# Patient Record
Sex: FEMALE | Race: BLACK OR AFRICAN AMERICAN | NOT HISPANIC OR LATINO
[De-identification: names, ages, dates, MRNs, and addresses within clinical notes are randomized per-mention and may not be internally consistent; named-entity substitution may affect disease eponyms.]

---

## 2020-03-09 PROBLEM — Z00.00 ENCOUNTER FOR PREVENTIVE HEALTH EXAMINATION: Status: ACTIVE | Noted: 2020-03-09

## 2020-04-02 ENCOUNTER — APPOINTMENT (OUTPATIENT)
Dept: PULMONOLOGY | Facility: CLINIC | Age: 70
End: 2020-04-02

## 2020-04-14 ENCOUNTER — NON-APPOINTMENT (OUTPATIENT)
Age: 70
End: 2020-04-14

## 2020-04-17 ENCOUNTER — APPOINTMENT (OUTPATIENT)
Dept: PULMONOLOGY | Facility: CLINIC | Age: 70
End: 2020-04-17

## 2020-04-20 DIAGNOSIS — Z87.39 PERSONAL HISTORY OF OTHER DISEASES OF THE MUSCULOSKELETAL SYSTEM AND CONNECTIVE TISSUE: ICD-10-CM

## 2020-04-20 DIAGNOSIS — Z87.19 PERSONAL HISTORY OF OTHER DISEASES OF THE DIGESTIVE SYSTEM: ICD-10-CM

## 2020-04-20 DIAGNOSIS — Z86.39 PERSONAL HISTORY OF OTHER ENDOCRINE, NUTRITIONAL AND METABOLIC DISEASE: ICD-10-CM

## 2020-04-20 RX ORDER — SITAGLIPTIN AND METFORMIN HYDROCHLORIDE 100; 1000 MG/1; MG/1
100-1000 TABLET, FILM COATED, EXTENDED RELEASE ORAL
Qty: 30 | Refills: 0 | Status: ACTIVE | COMMUNITY
Start: 2019-11-18

## 2020-04-20 RX ORDER — CHOLECALCIFEROL (VITAMIN D3) 1250 MCG
1.25 MG CAPSULE ORAL
Qty: 12 | Refills: 0 | Status: ACTIVE | COMMUNITY
Start: 2019-11-18

## 2020-04-20 RX ORDER — FLASH GLUCOSE SENSOR
KIT MISCELLANEOUS
Qty: 2 | Refills: 0 | Status: ACTIVE | COMMUNITY
Start: 2020-02-26

## 2020-04-20 RX ORDER — TOBRAMYCIN 3 MG/ML
0.3 SOLUTION/ DROPS OPHTHALMIC
Qty: 5 | Refills: 0 | Status: ACTIVE | COMMUNITY
Start: 2020-03-17

## 2020-04-20 RX ORDER — VALSARTAN 160 MG/1
160 TABLET ORAL
Qty: 30 | Refills: 0 | Status: ACTIVE | COMMUNITY
Start: 2020-01-16

## 2020-04-21 ENCOUNTER — APPOINTMENT (OUTPATIENT)
Dept: PULMONOLOGY | Facility: CLINIC | Age: 70
End: 2020-04-21
Payer: COMMERCIAL

## 2020-04-21 ENCOUNTER — APPOINTMENT (OUTPATIENT)
Dept: PULMONOLOGY | Facility: CLINIC | Age: 70
End: 2020-04-21

## 2020-04-21 DIAGNOSIS — E66.9 OBESITY, UNSPECIFIED: ICD-10-CM

## 2020-04-21 PROCEDURE — 99443: CPT

## 2020-04-22 ENCOUNTER — NON-APPOINTMENT (OUTPATIENT)
Age: 70
End: 2020-04-22

## 2020-04-24 NOTE — HISTORY OF PRESENT ILLNESS
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 PMH of DM, arthritis,  knee surgery, obesity, GERD. She is a life long non smoker. No pets at home. She is 5'7", approx 170 lbs\par \par she reports dyspnea is worse with extreme exertion. 6mwt in Dr Araujo office showed desat 88%\par Pt was using inhalers but they did not help so she stopped\par \par CXR showed overinflation\par CTA chest was neg for pE but showed sclerotic changes of her LS spine\par \par

## 2020-04-24 NOTE — DISCUSSION/SUMMARY
[FreeTextEntry1] : -----assessment and plan----\par 68 yo referred for chronic dyspnea\par \par will start prednisone 15mg x 1 week then taper to 10mg- f/u next week on how she is feeling\par will get bone scan given sclerotic LS spine\par will get labs (done at work) to r/o multiple myeloma\par will check cpet and EOT at ofice visit in july\par sleep study w/Dr Araujo\par \par reviewed covid precautions, all questions answered\par \par \par \par Thanks for allowing  me to participate  in the care of this patient.  Patient at this time  will follow  the above mentioned recommendations and return back for follow up visit. If you have any questions  I can be reached  at #[phone] (beeper)  or  [phone] (office).\par \par Merna Little MD, FCCP \par Director, Pulmonary Hypertension Program \par [facility] \par Division of Pulmonary, Critical Care and Sleep Medicine \par  Professor of Medicine \par [employer]\par

## 2020-04-27 NOTE — REVIEW OF SYSTEMS
[Dyspnea] : dyspnea [SOB on Exertion] : sob on exertion [Diabetes] : diabetes [Negative] : Neurologic

## 2020-04-28 ENCOUNTER — APPOINTMENT (OUTPATIENT)
Dept: PULMONOLOGY | Facility: CLINIC | Age: 70
End: 2020-04-28

## 2020-04-28 ENCOUNTER — APPOINTMENT (OUTPATIENT)
Dept: PULMONOLOGY | Facility: CLINIC | Age: 70
End: 2020-04-28
Payer: COMMERCIAL

## 2020-04-28 PROCEDURE — 99443: CPT

## 2020-04-28 NOTE — HISTORY OF PRESENT ILLNESS
[Never] : never [Home] : at home, [unfilled] , at the time of the visit. 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 PMH of DM, arthritis,  knee surgery, obesity, GERD. She is a life long non smoker. No pets at home. She is 5'7", approx 170 lbs\par \par she reports dyspnea is worse with extreme exertion. 6mwt in Dr Araujo office showed desat 88%\par Pt was using inhalers but they did not help so she stopped\par \par CXR showed overinflation\par CTA chest was neg for pE but showed sclerotic changes of her LS spine\par \par april 2020- c/o dyspnea-- prednisone is causing blood sugar elevations [FreeTextEntry2] : Hua

## 2020-05-04 ENCOUNTER — RX RENEWAL (OUTPATIENT)
Age: 70
End: 2020-05-04

## 2020-07-08 ENCOUNTER — APPOINTMENT (OUTPATIENT)
Dept: PULMONOLOGY | Facility: CLINIC | Age: 70
End: 2020-07-08

## 2020-07-13 ENCOUNTER — LABORATORY RESULT (OUTPATIENT)
Age: 70
End: 2020-07-13

## 2020-07-13 ENCOUNTER — APPOINTMENT (OUTPATIENT)
Dept: PULMONOLOGY | Facility: CLINIC | Age: 70
End: 2020-07-13
Payer: COMMERCIAL

## 2020-07-13 VITALS
OXYGEN SATURATION: 99 % | DIASTOLIC BLOOD PRESSURE: 90 MMHG | WEIGHT: 175 LBS | HEART RATE: 86 BPM | TEMPERATURE: 98 F | RESPIRATION RATE: 17 BRPM | SYSTOLIC BLOOD PRESSURE: 164 MMHG

## 2020-07-13 PROCEDURE — ZZZZZ: CPT

## 2020-07-13 PROCEDURE — 94618 PULMONARY STRESS TESTING: CPT

## 2020-07-13 PROCEDURE — 94726 PLETHYSMOGRAPHY LUNG VOLUMES: CPT

## 2020-07-13 PROCEDURE — 94010 BREATHING CAPACITY TEST: CPT

## 2020-07-13 PROCEDURE — 99215 OFFICE O/P EST HI 40 MIN: CPT | Mod: 25

## 2020-07-13 PROCEDURE — 36415 COLL VENOUS BLD VENIPUNCTURE: CPT

## 2020-07-13 PROCEDURE — 94729 DIFFUSING CAPACITY: CPT

## 2020-07-13 NOTE — HISTORY OF PRESENT ILLNESS
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 PMH of DM, arthritis,  knee surgery, obesity, GERD. She is a life long non smoker. No pets at home. She is 5'7", approx 170 lbs\par \par she reports dyspnea is worse with extreme exertion. 6mwt in Dr Araujo office showed desat 88%\par Pt was using inhalers but they did not help so she stopped\par \par CXR showed overinflation\par CTA chest was neg for pE but showed sclerotic changes of her LS spine\par \par PFT July 2020 within normal limit\par Exercise oximetry July 2020 no evidence of desaturation\par april 2020- c/o dyspnea-- prednisone is causing blood sugar elevations\par \par \par july 2020---Patient was seen in the office today complaints of backache--- was referred from Dr. Araujo because on his 6-minute walk test there was some evidence of desaturation------- exercise oximetry done in our office in July 2020 shows no evidence of desaturation-----she does have evidence of sclerotic process in her lumbar spine for which she has been referred for further work-up to spine specialist and hematology oncology----------- I would like to get details of her cardiac work-up from Dr. Clifford's office------ advised to do full pulmonary function testing sleep study through Dr. Araujo's office------ return to my clinic in 2 months time\par \par ----I have kept her on steroids but she felt better on that but was not able to tolerate it and that has been stopped I have advised her to continue with Symbicort in the meantime

## 2020-07-13 NOTE — DISCUSSION/SUMMARY
[FreeTextEntry1] : -----assessment and plan----\par 68 yo referred for chronic dyspnea\par Not clear if she has any specific pulmonary disorders she did not desaturate on exercise oximetry in July 2020---\par --- unable to tolerate prednisone\par \par Continue Symbicort and Ventolin for now\par \par --- Further work-up for sclerotic process in the spine rule out multiple myeloma-Dr. Baltazar Abbott\par \par Due to backache unable to do CT PET\par \par Sleep study \par ----Diabetes mellitus follow-up with endocrinologist\par f/u in  2 months \par reviewed covid precautions, all questions answered\par \par \par \par Thanks for allowing  me to participate  in the care of this patient.  Patient at this time  will follow  the above mentioned recommendations and return back for follow up visit. If you have any questions  I can be reached  at #[phone] (beeper)  or  [phone] (office).\par \par Merna Little MD, FCCP \par Director, Pulmonary Hypertension Program \par [facility] \par Division of Pulmonary, Critical Care and Sleep Medicine \par  Professor of Medicine \par [employer]\par

## 2020-07-13 NOTE — REVIEW OF SYSTEMS
[Dyspnea] : dyspnea [SOB on Exertion] : sob on exertion [Diabetes] : diabetes [Negative] : Psychiatric

## 2020-07-14 LAB
25(OH)D3 SERPL-MCNC: 28.7 NG/ML
A1AT SERPL-MCNC: 134 MG/DL
ALBUMIN SERPL ELPH-MCNC: 4.8 G/DL
ALP BLD-CCNC: 88 U/L
ALT SERPL-CCNC: 19 U/L
ANACR T: NEGATIVE
ANION GAP SERPL CALC-SCNC: 15 MMOL/L
APTT BLD: 30.1 SEC
AST SERPL-CCNC: 20 U/L
BASOPHILS # BLD AUTO: 0.01 K/UL
BASOPHILS NFR BLD AUTO: 0.2 %
BILIRUB SERPL-MCNC: 0.2 MG/DL
BUN SERPL-MCNC: 12 MG/DL
CALCIUM SERPL-MCNC: 9.9 MG/DL
CALCIUM SERPL-MCNC: 9.9 MG/DL
CARDIOLIPIN AB SER IA-ACNC: NEGATIVE
CCP AB SER IA-ACNC: <8 UNITS
CHLORIDE SERPL-SCNC: 104 MMOL/L
CO2 SERPL-SCNC: 22 MMOL/L
CREAT SERPL-MCNC: 0.73 MG/DL
DEPRECATED KAPPA LC FREE/LAMBDA SER: 1.23 RATIO
ENA SCL70 IGG SER IA-ACNC: <0.2 AL
ENA SS-A AB SER IA-ACNC: <0.2 AL
ENA SS-B AB SER IA-ACNC: <0.2 AL
EOSINOPHIL # BLD AUTO: 0.06 K/UL
EOSINOPHIL NFR BLD AUTO: 1 %
ERYTHROCYTE [SEDIMENTATION RATE] IN BLOOD BY WESTERGREN METHOD: 55 MM/HR
ESTIMATED AVERAGE GLUCOSE: 154 MG/DL
FERRITIN SERPL-MCNC: 36 NG/ML
GLUCOSE SERPL-MCNC: 94 MG/DL
HBA1C MFR BLD HPLC: 7 %
HCT VFR BLD CALC: 43.7 %
HCYS SERPL-MCNC: 8.7 UMOL/L
HGB BLD-MCNC: 13.3 G/DL
IGA SER QL IEP: 217 MG/DL
IGG SER QL IEP: 1633 MG/DL
IGM SER QL IEP: 23 MG/DL
IMM GRANULOCYTES NFR BLD AUTO: 0.2 %
INR PPP: 0.94 RATIO
KAPPA LC CSF-MCNC: 1.61 MG/DL
KAPPA LC SERPL-MCNC: 1.98 MG/DL
LDH SERPL-CCNC: 190 U/L
LYMPHOCYTES # BLD AUTO: 1.8 K/UL
LYMPHOCYTES NFR BLD AUTO: 30.7 %
MAN DIFF?: NORMAL
MCHC RBC-ENTMCNC: 27.5 PG
MCHC RBC-ENTMCNC: 30.4 GM/DL
MCV RBC AUTO: 90.5 FL
MONOCYTES # BLD AUTO: 0.42 K/UL
MONOCYTES NFR BLD AUTO: 7.2 %
NEUTROPHILS # BLD AUTO: 3.57 K/UL
NEUTROPHILS NFR BLD AUTO: 60.7 %
NT-PROBNP SERPL-MCNC: 17 PG/ML
PARATHYROID HORMONE INTACT: 59 PG/ML
PHOSPHATE SERPL-MCNC: 2.4 MG/DL
PLATELET # BLD AUTO: 292 K/UL
POTASSIUM SERPL-SCNC: 4.1 MMOL/L
PROT SERPL-MCNC: 7.6 G/DL
PT BLD: 11.1 SEC
RBC # BLD: 4.83 M/UL
RBC # FLD: 13.6 %
RF+CCP IGG SER-IMP: NEGATIVE
RHEUMATOID FACT SER QL: <10 IU/ML
SODIUM SERPL-SCNC: 140 MMOL/L
TSH SERPL-ACNC: 0.73 UIU/ML
URATE SERPL-MCNC: 4.4 MG/DL
VIT B12 SERPL-MCNC: 773 PG/ML
WBC # FLD AUTO: 5.87 K/UL

## 2020-07-15 LAB — TOTAL IGE SMQN RAST: 139 KU/L

## 2020-07-16 LAB
ALBUMIN MFR SERPL ELPH: 56.6 %
ALBUMIN SERPL-MCNC: 4.3 G/DL
ALBUMIN/GLOB SERPL: 1.3 RATIO
ALPHA1 GLOB MFR SERPL ELPH: 3.6 %
ALPHA1 GLOB SERPL ELPH-MCNC: 0.3 G/DL
ALPHA2 GLOB MFR SERPL ELPH: 11 %
ALPHA2 GLOB SERPL ELPH-MCNC: 0.8 G/DL
B-GLOBULIN MFR SERPL ELPH: 11.4 %
B-GLOBULIN SERPL ELPH-MCNC: 0.9 G/DL
GAMMA GLOB FLD ELPH-MCNC: 1.3 G/DL
GAMMA GLOB MFR SERPL ELPH: 17.4 %
INTERPRETATION SERPL IEP-IMP: NORMAL
PROT SERPL-MCNC: 7.6 G/DL
PROT SERPL-MCNC: 7.6 G/DL
SARS-COV-2 N GENE NPH QL NAA+PROBE: NOT DETECTED

## 2020-08-20 ENCOUNTER — OUTPATIENT (OUTPATIENT)
Dept: OUTPATIENT SERVICES | Facility: HOSPITAL | Age: 70
LOS: 1 days | Discharge: HOME | End: 2020-08-20

## 2020-08-20 DIAGNOSIS — C90.00 MULTIPLE MYELOMA NOT HAVING ACHIEVED REMISSION: ICD-10-CM

## 2020-08-25 ENCOUNTER — RESULT REVIEW (OUTPATIENT)
Age: 70
End: 2020-08-25

## 2020-08-25 ENCOUNTER — TRANSCRIPTION ENCOUNTER (OUTPATIENT)
Age: 70
End: 2020-08-25

## 2020-08-25 ENCOUNTER — APPOINTMENT (OUTPATIENT)
Dept: HEMATOLOGY ONCOLOGY | Facility: CLINIC | Age: 70
End: 2020-08-25
Payer: COMMERCIAL

## 2020-08-25 VITALS
OXYGEN SATURATION: 99 % | TEMPERATURE: 98 F | WEIGHT: 176.37 LBS | BODY MASS INDEX: 29.38 KG/M2 | SYSTOLIC BLOOD PRESSURE: 155 MMHG | RESPIRATION RATE: 18 BRPM | HEART RATE: 83 BPM | DIASTOLIC BLOOD PRESSURE: 81 MMHG | HEIGHT: 65 IN

## 2020-08-25 DIAGNOSIS — Z80.0 FAMILY HISTORY OF MALIGNANT NEOPLASM OF DIGESTIVE ORGANS: ICD-10-CM

## 2020-08-25 LAB
BASOPHILS # BLD AUTO: 0.03 K/UL — SIGNIFICANT CHANGE UP (ref 0–0.2)
BASOPHILS NFR BLD AUTO: 0.5 % — SIGNIFICANT CHANGE UP (ref 0–2)
EOSINOPHIL # BLD AUTO: 0.11 K/UL — SIGNIFICANT CHANGE UP (ref 0–0.5)
EOSINOPHIL NFR BLD AUTO: 1.9 % — SIGNIFICANT CHANGE UP (ref 0–6)
HCT VFR BLD CALC: 39.7 % — SIGNIFICANT CHANGE UP (ref 34.5–45)
HGB BLD-MCNC: 12.6 G/DL — SIGNIFICANT CHANGE UP (ref 11.5–15.5)
IMM GRANULOCYTES NFR BLD AUTO: 1.5 % — SIGNIFICANT CHANGE UP (ref 0–1.5)
LYMPHOCYTES # BLD AUTO: 1.77 K/UL — SIGNIFICANT CHANGE UP (ref 1–3.3)
LYMPHOCYTES # BLD AUTO: 30 % — SIGNIFICANT CHANGE UP (ref 13–44)
MCHC RBC-ENTMCNC: 28.3 PG — SIGNIFICANT CHANGE UP (ref 27–34)
MCHC RBC-ENTMCNC: 31.7 GM/DL — LOW (ref 32–36)
MCV RBC AUTO: 89 FL — SIGNIFICANT CHANGE UP (ref 80–100)
MONOCYTES # BLD AUTO: 0.4 K/UL — SIGNIFICANT CHANGE UP (ref 0–0.9)
MONOCYTES NFR BLD AUTO: 6.8 % — SIGNIFICANT CHANGE UP (ref 2–14)
NEUTROPHILS # BLD AUTO: 3.5 K/UL — SIGNIFICANT CHANGE UP (ref 1.8–7.4)
NEUTROPHILS NFR BLD AUTO: 59.3 % — SIGNIFICANT CHANGE UP (ref 43–77)
NRBC # BLD: 0 /100 WBCS — SIGNIFICANT CHANGE UP (ref 0–0)
PLATELET # BLD AUTO: 237 K/UL — SIGNIFICANT CHANGE UP (ref 150–400)
RBC # BLD: 4.46 M/UL — SIGNIFICANT CHANGE UP (ref 3.8–5.2)
RBC # FLD: 13.5 % — SIGNIFICANT CHANGE UP (ref 10.3–14.5)
WBC # BLD: 5.9 K/UL — SIGNIFICANT CHANGE UP (ref 3.8–10.5)
WBC # FLD AUTO: 5.9 K/UL — SIGNIFICANT CHANGE UP (ref 3.8–10.5)

## 2020-08-25 PROCEDURE — 99205 OFFICE O/P NEW HI 60 MIN: CPT

## 2020-09-02 NOTE — HISTORY OF PRESENT ILLNESS
[Disease:__________________________] : Disease: [unfilled] [de-identified] : 69 year old female with a pmh of DM, arthritis, knee surgery, GERD here with a slightly abnormal serum free light chain ratio but a sclerotic finding in the spine.\par In January, she started having shortness of breath. She had a cardiac work up.  CT angio was negative.   Added diovan. Then she went to see pulmonary, and was told her lungs are okay.  \par \par MRI performed 4/20: shows severe spinal canal stenosis at T12-L1.  No suspicious marrow signal abnormaility to suggest infectious process. \par  \par Back only hurts when changing position . She feels stiff. \par \par On CT scan in march 2020 for PE, she was found to have sclerotic findings throughout lumbar spine, Correlation with bone scan recommended to exclude infiltrative process. \par \par Bone scan shows no intense uptake in the osseous structures to suggest osseous metastasis. \par \par Patient has brought a copy of her bone scan, and her ct and mri today. \par \par Not sleeping more. Energy level feels less than before. She also has difficulty sleeping. Appetite is good. No f/c/s. No n/v/d. No Le swelling. \par \par Last colonoscopy, was in 2018. Due again november. \par \par Reports she craves very cold water. This seems more to be happening over the last 1 year. She now has to carry cold water all the time with her.   Not craving chewing ice. otherwise no odd cravings. She is doing B12 shows 1x/month.  [de-identified] : -

## 2020-09-02 NOTE — ASSESSMENT
[FreeTextEntry1] : 69 year old female here to see me for sclerotic bone lesions on CT. \par \par -CT scan shows a sclerotic lesions in march, since then she had an MRI and a Bone scan which do not show these findings.\par -she has brought the cd of the mri and the bone scan today. \par -will scan and see if we see any sclerotic bone lesions on these - will discuss with radiology\par -repeat IMEL today to ensure no finidngs c/w myeloma. will also repeat cmp and ldh/uric acid (all is normal)\par -pending this, return visit 4 weeks to discuss if any findgins- any reason to do bone marrow and or pet scan.\par -check iron studies? ferritin 35- maybe DERRICK causing byrd - will advise small iron supplement given iron findings, ferritin is on the lower side though not truly low\par \par

## 2020-09-09 LAB
ALBUMIN MFR SERPL ELPH: 57.1 %
ALBUMIN SERPL ELPH-MCNC: 4.6 G/DL
ALBUMIN SERPL-MCNC: 4.2 G/DL
ALBUMIN/GLOB SERPL: 1.3 RATIO
ALP BLD-CCNC: 84 U/L
ALPHA1 GLOB MFR SERPL ELPH: 3.6 %
ALPHA1 GLOB SERPL ELPH-MCNC: 0.3 G/DL
ALPHA2 GLOB MFR SERPL ELPH: 10.6 %
ALPHA2 GLOB SERPL ELPH-MCNC: 0.8 G/DL
ALT SERPL-CCNC: 13 U/L
ANION GAP SERPL CALC-SCNC: 13 MMOL/L
AST SERPL-CCNC: 18 U/L
B-GLOBULIN MFR SERPL ELPH: 10.9 %
B-GLOBULIN SERPL ELPH-MCNC: 0.8 G/DL
BILIRUB SERPL-MCNC: 0.2 MG/DL
BUN SERPL-MCNC: 10 MG/DL
CALCIUM SERPL-MCNC: 9.6 MG/DL
CHLORIDE SERPL-SCNC: 105 MMOL/L
CO2 SERPL-SCNC: 26 MMOL/L
CREAT SERPL-MCNC: 0.7 MG/DL
DEPRECATED KAPPA LC FREE/LAMBDA SER: 1.4 RATIO
DEPRECATED KAPPA LC FREE/LAMBDA SER: 1.4 RATIO
FERRITIN SERPL-MCNC: 38 NG/ML
GAMMA GLOB FLD ELPH-MCNC: 1.3 G/DL
GAMMA GLOB MFR SERPL ELPH: 17.8 %
GLUCOSE SERPL-MCNC: 94 MG/DL
IGA SER QL IEP: 173 MG/DL
IGA SER QL IEP: 173 MG/DL
IGG SER QL IEP: 1176 MG/DL
IGM SER QL IEP: 22 MG/DL
IGM SER QL IEP: 22 MG/DL
INTERPRETATION SERPL IEP-IMP: NORMAL
IRON SATN MFR SERPL: 18 %
IRON SERPL-MCNC: 60 UG/DL
KAPPA LC CSF-MCNC: 1.36 MG/DL
KAPPA LC CSF-MCNC: 1.36 MG/DL
KAPPA LC SERPL-MCNC: 1.91 MG/DL
KAPPA LC SERPL-MCNC: 1.91 MG/DL
LDH SERPL-CCNC: 165 U/L
M PROTEIN SPEC IFE-MCNC: NORMAL
POTASSIUM SERPL-SCNC: 4.2 MMOL/L
PROT SERPL-MCNC: 7.3 G/DL
PROT SERPL-MCNC: 7.4 G/DL
PROT SERPL-MCNC: 7.4 G/DL
SODIUM SERPL-SCNC: 144 MMOL/L
TIBC SERPL-MCNC: 340 UG/DL
UIBC SERPL-MCNC: 279 UG/DL
URATE SERPL-MCNC: 4.7 MG/DL

## 2020-09-28 ENCOUNTER — OUTPATIENT (OUTPATIENT)
Dept: OUTPATIENT SERVICES | Facility: HOSPITAL | Age: 70
LOS: 1 days | Discharge: HOME | End: 2020-09-28

## 2020-09-28 DIAGNOSIS — C90.00 MULTIPLE MYELOMA NOT HAVING ACHIEVED REMISSION: ICD-10-CM

## 2020-10-06 ENCOUNTER — RESULT REVIEW (OUTPATIENT)
Age: 70
End: 2020-10-06

## 2020-10-06 ENCOUNTER — APPOINTMENT (OUTPATIENT)
Dept: HEMATOLOGY ONCOLOGY | Facility: CLINIC | Age: 70
End: 2020-10-06
Payer: COMMERCIAL

## 2020-10-06 VITALS
RESPIRATION RATE: 16 BRPM | BODY MASS INDEX: 29.2 KG/M2 | WEIGHT: 175.25 LBS | HEIGHT: 65 IN | DIASTOLIC BLOOD PRESSURE: 85 MMHG | HEART RATE: 79 BPM | OXYGEN SATURATION: 98 % | SYSTOLIC BLOOD PRESSURE: 175 MMHG

## 2020-10-06 LAB
BASOPHILS # BLD AUTO: 0.03 K/UL — SIGNIFICANT CHANGE UP (ref 0–0.2)
BASOPHILS NFR BLD AUTO: 0.5 % — SIGNIFICANT CHANGE UP (ref 0–2)
EOSINOPHIL # BLD AUTO: 0.05 K/UL — SIGNIFICANT CHANGE UP (ref 0–0.5)
EOSINOPHIL NFR BLD AUTO: 0.8 % — SIGNIFICANT CHANGE UP (ref 0–6)
HCT VFR BLD CALC: 41.5 % — SIGNIFICANT CHANGE UP (ref 34.5–45)
HGB BLD-MCNC: 13 G/DL — SIGNIFICANT CHANGE UP (ref 11.5–15.5)
IMM GRANULOCYTES NFR BLD AUTO: 0.2 % — SIGNIFICANT CHANGE UP (ref 0–1.5)
LYMPHOCYTES # BLD AUTO: 2.02 K/UL — SIGNIFICANT CHANGE UP (ref 1–3.3)
LYMPHOCYTES # BLD AUTO: 30.5 % — SIGNIFICANT CHANGE UP (ref 13–44)
MCHC RBC-ENTMCNC: 28.1 PG — SIGNIFICANT CHANGE UP (ref 27–34)
MCHC RBC-ENTMCNC: 31.3 G/DL — LOW (ref 32–36)
MCV RBC AUTO: 89.8 FL — SIGNIFICANT CHANGE UP (ref 80–100)
MONOCYTES # BLD AUTO: 0.39 K/UL — SIGNIFICANT CHANGE UP (ref 0–0.9)
MONOCYTES NFR BLD AUTO: 5.9 % — SIGNIFICANT CHANGE UP (ref 2–14)
NEUTROPHILS # BLD AUTO: 4.13 K/UL — SIGNIFICANT CHANGE UP (ref 1.8–7.4)
NEUTROPHILS NFR BLD AUTO: 62.1 % — SIGNIFICANT CHANGE UP (ref 43–77)
NRBC # BLD: 0 /100 WBCS — SIGNIFICANT CHANGE UP (ref 0–0)
PLATELET # BLD AUTO: 274 K/UL — SIGNIFICANT CHANGE UP (ref 150–400)
RBC # BLD: 4.62 M/UL — SIGNIFICANT CHANGE UP (ref 3.8–5.2)
RBC # FLD: 13.6 % — SIGNIFICANT CHANGE UP (ref 10.3–14.5)
WBC # BLD: 6.63 K/UL — SIGNIFICANT CHANGE UP (ref 3.8–10.5)
WBC # FLD AUTO: 6.63 K/UL — SIGNIFICANT CHANGE UP (ref 3.8–10.5)

## 2020-10-06 PROCEDURE — 99213 OFFICE O/P EST LOW 20 MIN: CPT

## 2020-10-09 ENCOUNTER — APPOINTMENT (OUTPATIENT)
Dept: CV DIAGNOSITCS | Facility: HOSPITAL | Age: 70
End: 2020-10-09

## 2020-10-09 ENCOUNTER — OUTPATIENT (OUTPATIENT)
Dept: OUTPATIENT SERVICES | Facility: HOSPITAL | Age: 70
LOS: 1 days | End: 2020-10-09
Payer: COMMERCIAL

## 2020-10-09 DIAGNOSIS — R06.00 DYSPNEA, UNSPECIFIED: ICD-10-CM

## 2020-10-09 PROCEDURE — 93306 TTE W/DOPPLER COMPLETE: CPT | Mod: 26

## 2020-10-09 PROCEDURE — 93306 TTE W/DOPPLER COMPLETE: CPT

## 2020-10-12 NOTE — ASSESSMENT
[FreeTextEntry1] : 69 year old female here to see me for sclerotic bone lesions on CT. \par \par -CT scan shows a sclerotic lesions in march, since then she had an MRI and a Bone scan do not show these findings. discussed with radiology- no clear etiology and findings not seen on other imaging  modalities \par -IMEl remails negative except for low igM - she will discuss this with allergy immunology though no increased infections \par -return visit prn

## 2020-10-12 NOTE — HISTORY OF PRESENT ILLNESS
[Disease:__________________________] : Disease: [unfilled] [de-identified] : 69 year old female with a pmh of DM, arthritis, knee surgery, GERD here with a slightly abnormal serum free light chain ratio but a sclerotic finding in the spine.\par In January, she started having shortness of breath. She had a cardiac work up.  CT angio was negative.   Added diovan. Then she went to see pulmonary, and was told her lungs are okay.  \par \par MRI performed 4/20: shows severe spinal canal stenosis at T12-L1.  No suspicious marrow signal abnormaility to suggest infectious process. \par  \par Back only hurts when changing position . She feels stiff. \par \par On CT scan in march 2020 for PE, she was found to have sclerotic findings throughout lumbar spine, Correlation with bone scan recommended to exclude infiltrative process. \par \par Bone scan shows no intense uptake in the osseous structures to suggest osseous metastasis. \par \par Patient has brought a copy of her bone scan, and her ct and mri today. \par \par Not sleeping more. Energy level feels less than before. She also has difficulty sleeping. Appetite is good. No f/c/s. No n/v/d. No Le swelling. \par \par Last colonoscopy, was in 2018. Due again november. \par \par Reports she craves very cold water. This seems more to be happening over the last 1 year. She now has to carry cold water all the time with her.   Not craving chewing ice. otherwise no odd cravings. She is doing B12 shows 1x/month.  [de-identified] : -no clear DERRICK\par -no evidence of suspicious sclerotic lesion on bone scan or on mri to correlate with ct finding\par -dicussed with dariology\par -iron supplementation has not helped shortness of breath\par

## 2020-10-13 ENCOUNTER — APPOINTMENT (OUTPATIENT)
Dept: PULMONOLOGY | Facility: CLINIC | Age: 70
End: 2020-10-13
Payer: COMMERCIAL

## 2020-10-13 VITALS
HEART RATE: 82 BPM | BODY MASS INDEX: 29.16 KG/M2 | HEIGHT: 65 IN | RESPIRATION RATE: 16 BRPM | WEIGHT: 175 LBS | SYSTOLIC BLOOD PRESSURE: 147 MMHG | DIASTOLIC BLOOD PRESSURE: 79 MMHG | TEMPERATURE: 97.8 F

## 2020-10-13 LAB — TSH SERPL-ACNC: 0.7 UIU/ML

## 2020-10-13 PROCEDURE — 36415 COLL VENOUS BLD VENIPUNCTURE: CPT

## 2020-10-13 PROCEDURE — 99215 OFFICE O/P EST HI 40 MIN: CPT | Mod: 25

## 2020-10-13 RX ORDER — BUDESONIDE AND FORMOTEROL FUMARATE DIHYDRATE 80; 4.5 UG/1; UG/1
80-4.5 AEROSOL RESPIRATORY (INHALATION) TWICE DAILY
Qty: 1 | Refills: 1 | Status: DISCONTINUED | COMMUNITY
Start: 2020-04-28 | End: 2020-10-13

## 2020-10-13 RX ORDER — PREDNISONE 5 MG/1
5 TABLET ORAL
Qty: 42 | Refills: 1 | Status: DISCONTINUED | COMMUNITY
Start: 2020-04-21 | End: 2020-10-13

## 2020-10-13 NOTE — HISTORY OF PRESENT ILLNESS
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 PMH of DM, arthritis,  knee surgery, obesity, GERD. She is a life long non smoker. No pets at home. She is 5'7", approx 170 lbs\par \par she reports dyspnea is worse with extreme exertion. 6mwt in Dr Araujo office showed desat 88%\par Pt was using inhalers but they did not help so she stopped\par \par CXR showed overinflation\par CTA chest was neg for pE but showed sclerotic changes of her LS spine\par \par PFT July 2020 within normal limit\par Exercise oximetry July 2020 no evidence of desaturation\par april 2020- c/o dyspnea-- prednisone is causing blood sugar elevations\par \par \par july 2020---Patient was seen in the office today complaints of backache--- was referred from Dr. Araujo because on his 6-minute walk test there was some evidence of desaturation------- exercise oximetry done in our office in July 2020 shows no evidence of desaturation-----she does have evidence of sclerotic process in her lumbar spine for which she has been referred for further work-up to spine specialist and hematology oncology----------- I would like to get details of her cardiac work-up from Dr. Clifford's office------ advised to do full pulmonary function testing sleep study through Dr. Araujo's office------ return to my clinic in 2 months time\par \par ----I have kept her on steroids but she felt better on that but was not able to tolerate it and that has been stopped I have advised her to continue with Symbicort in the meantime

## 2020-10-13 NOTE — DISCUSSION/SUMMARY
[FreeTextEntry1] : -----assessment and plan----\par 68 yo referred for chronic dyspnea\par Not clear if she has any specific pulmonary disorders she did not desaturate on exercise oximetry in July 2020---echo normal---due to backache unable to do cpet\par \par 1----low dose prednisosne\par 2 muscle testing r/o  myasthenia\par 3____????/  selective IgM deficiency (SIGMD)--  refd to immunology\par \par 4Continue Symbicort and Ventolin for now\par 5 sleep study \par \par --- Further work-up for sclerotic process in the spine rule out multiple myeloma-Dr. Baltazar Abbott\par \par Due to backache unable to do CT PET\par \par Sleep study \par ----Diabetes mellitus follow-up with endocrinologist\par f/u in  2 months \par reviewed covid precautions, all questions answered\par \par \par \par Thanks for allowing  me to participate  in the care of this patient.  Patient at this time  will follow  the above mentioned recommendations and return back for follow up visit. If you have any questions  I can be reached  at #[phone] (beeper)  or  [phone] (office).\par \par Merna Little MD, [facility] \par Director, Pulmonary Hypertension Program \par [facility] \par Division of Pulmonary, Critical Care and Sleep Medicine \par  Professor of Medicine \par [employer]\par

## 2020-10-14 LAB
ESTIMATED AVERAGE GLUCOSE: 160 MG/DL
HBA1C MFR BLD HPLC: 7.2 %
SARS-COV-2 IGG SERPL IA-ACNC: <0.1 INDEX
SARS-COV-2 IGG SERPL QL IA: NEGATIVE
THYROGLOB AB SERPL-ACNC: <20 IU/ML
THYROPEROXIDASE AB SERPL IA-ACNC: 34.4 IU/ML

## 2020-10-20 LAB
CORTICOSTEROID BIND GLOBULIN: 2.3 MG/DL
CORTIS SERPL-MCNC: 8.9 UG/DL
CORTISOL, FREE: 0.42 UG/DL
PFCX: 4.7 %

## 2020-11-02 ENCOUNTER — APPOINTMENT (OUTPATIENT)
Dept: PULMONOLOGY | Facility: CLINIC | Age: 70
End: 2020-11-02

## 2020-11-04 ENCOUNTER — APPOINTMENT (OUTPATIENT)
Dept: PULMONOLOGY | Facility: CLINIC | Age: 70
End: 2020-11-04
Payer: COMMERCIAL

## 2020-11-04 ENCOUNTER — APPOINTMENT (OUTPATIENT)
Dept: PULMONOLOGY | Facility: CLINIC | Age: 70
End: 2020-11-04

## 2020-11-04 LAB — SARS-COV-2 N GENE NPH QL NAA+PROBE: NOT DETECTED

## 2020-11-04 PROCEDURE — 94799 UNLISTED PULMONARY SVC/PX: CPT

## 2020-11-04 PROCEDURE — 99072 ADDL SUPL MATRL&STAF TM PHE: CPT

## 2020-11-18 LAB
ALBUMIN MFR SERPL ELPH: 57 %
ALBUMIN SERPL ELPH-MCNC: 4.9 G/DL
ALBUMIN SERPL-MCNC: 4.4 G/DL
ALBUMIN/GLOB SERPL: 1.3 RATIO
ALP BLD-CCNC: 91 U/L
ALPHA1 GLOB MFR SERPL ELPH: 3.7 %
ALPHA1 GLOB SERPL ELPH-MCNC: 0.3 G/DL
ALPHA2 GLOB MFR SERPL ELPH: 10.2 %
ALPHA2 GLOB SERPL ELPH-MCNC: 0.8 G/DL
ALT SERPL-CCNC: 20 U/L
ANION GAP SERPL CALC-SCNC: 11 MMOL/L
AST SERPL-CCNC: 21 U/L
B-GLOBULIN MFR SERPL ELPH: 11.3 %
B-GLOBULIN SERPL ELPH-MCNC: 0.9 G/DL
BILIRUB SERPL-MCNC: 0.2 MG/DL
BUN SERPL-MCNC: 11 MG/DL
CALCIUM SERPL-MCNC: 10.3 MG/DL
CHLORIDE SERPL-SCNC: 103 MMOL/L
CO2 SERPL-SCNC: 28 MMOL/L
CREAT SERPL-MCNC: 0.8 MG/DL
DEPRECATED KAPPA LC FREE/LAMBDA SER: 1.43 RATIO
FERRITIN SERPL-MCNC: 48 NG/ML
GAMMA GLOB FLD ELPH-MCNC: 1.4 G/DL
GAMMA GLOB MFR SERPL ELPH: 17.8 %
GLUCOSE SERPL-MCNC: 147 MG/DL
IGA SER QL IEP: 175 MG/DL
IGG SER QL IEP: 1182 MG/DL
IGM SER QL IEP: 22 MG/DL
INTERPRETATION SERPL IEP-IMP: NORMAL
IRON SATN MFR SERPL: 21 %
IRON SERPL-MCNC: 78 UG/DL
KAPPA LC CSF-MCNC: 1.4 MG/DL
KAPPA LC SERPL-MCNC: 2 MG/DL
LDH SERPL-CCNC: 180 U/L
M PROTEIN SPEC IFE-MCNC: NORMAL
POTASSIUM SERPL-SCNC: 3.8 MMOL/L
PROT SERPL-MCNC: 7.7 G/DL
SODIUM SERPL-SCNC: 142 MMOL/L
TIBC SERPL-MCNC: 373 UG/DL
UIBC SERPL-MCNC: 295 UG/DL
URATE SERPL-MCNC: 4.3 MG/DL

## 2020-12-08 ENCOUNTER — APPOINTMENT (OUTPATIENT)
Dept: NEUROLOGY | Facility: CLINIC | Age: 70
End: 2020-12-08
Payer: COMMERCIAL

## 2020-12-08 VITALS
SYSTOLIC BLOOD PRESSURE: 177 MMHG | HEART RATE: 93 BPM | HEIGHT: 65 IN | DIASTOLIC BLOOD PRESSURE: 96 MMHG | WEIGHT: 172 LBS | BODY MASS INDEX: 28.66 KG/M2

## 2020-12-08 VITALS — SYSTOLIC BLOOD PRESSURE: 179 MMHG | HEART RATE: 92 BPM | DIASTOLIC BLOOD PRESSURE: 103 MMHG

## 2020-12-08 VITALS — TEMPERATURE: 98 F

## 2020-12-08 PROCEDURE — 99205 OFFICE O/P NEW HI 60 MIN: CPT

## 2020-12-08 PROCEDURE — 99072 ADDL SUPL MATRL&STAF TM PHE: CPT

## 2020-12-10 NOTE — PHYSICAL EXAM
[General Appearance - Alert] : alert [General Appearance - In No Acute Distress] : in no acute distress [Oriented To Time, Place, And Person] : oriented to person, place, and time [Impaired Insight] : insight and judgment were intact [Affect] : the affect was normal [Extraocular Movements] : extraocular movements were intact [Hearing Threshold Finger Rub Not [place]] : hearing was normal [] : no respiratory distress [Edema] : there was no peripheral edema [Abdomen Soft] : soft [Abnormal Walk] : normal gait [Skin Color & Pigmentation] : normal skin color and pigmentation [FreeTextEntry1] : Patient is awake and alert,pleasant and cooperative with the exam\par Extraocular movements are intact\par No double vision or ptosis with sustained upgaze, no weakness of neck flexors or extensors\par Pupils are equal and reacting to light\par Face is symmetric\par No tremors or bradykinesia\par Slight weakness in right hip flexors rest of the exam is normal\par Deep tendon reflexes 1+ symmetric\par Gait is unremarkable\par Has difficulty getting up from the chair without pushing up\par

## 2020-12-10 NOTE — HISTORY OF PRESENT ILLNESS
[FreeTextEntry1] : This is a 70-year-old right-handed female who presents with chief complaints of dyspnea on exertion\par She states that she cannot walk even one block or a flight of steps without being out of breath. She has been tiring easily and reports aches and pains in her joints back leg and knee. She has followed up with pulmonologist and suggestion was made to rule out myasthenia gravis.\par \par She does note some fluctuations and feeling tired easily. No difficulty with swallowing. Speech is okay but after a lot of talking she needs to catch her breath. She denies any double vision but states that when she wakes up in the morning her left eye shut than she needs to physically dependent. This has been going on for about 3-4 months.\par She denies any changes in the color of her urine denies myalgias. Her balance is okay no change in bowel habits. She is noticing increased frequency of urination and some stress incontinence but feels that it is age related.\par No changes in memory\par \par Review of systems-a complete review of systems is performed and is negative except as listed in history of present illness\par \par Past medical history\par Right knee replacement\par Hypertension\par Diabetes for 10 years\par Anxiety\par \par Family history of diabetes and colon cancer no neurological conditions,

## 2020-12-10 NOTE — DATA REVIEWED
[de-identified] : MRI lumbar spine without contrast was done March 2020\par Impression\par extensive multilevel degenerative disc disease throughout lumbar spine with bilateral facet and ligamentum flavum hypertrophy. No bone marrow signal abnormality suspicious for an infectious process\par Mild spinal canal stenosis at L3-L4, L4-L5 and L5-S1\par At L2-L3 level there is posterior disc osteophyte complex. There is impression upon the ventral aspect of the thecal sac with severe associated spinal canal stenosis. There is mild bilateral neuroforaminal stenosis.\par At T12-L1 level there is right paracentral disc herniation or extrusion demonstrating inferior margin of disc material along the dorsal aspect of L1 superior endplate. There is impression upon the ventral aspect of the thecal sac and spinal cord there is severe associated spinal canal stenosis.

## 2020-12-10 NOTE — DISCUSSION/SUMMARY
[FreeTextEntry1] : This is a 70-year-old female who presents with chief complaints of dyspnea on exertion. She is here to rule out myasthenia gravis or neuromuscular disease. Her exam is nonfocal other than right hip flexor weakness. I do have report of MRI lumbar spine which shows T12-L1 disc herniation with impression on the ventral aspect of the thecal sac and spinal cord. Also there is spinal stenosis.\par She reports difficulty opening the left eye on awakening in the morning\par Plan\par Check myasthenia gravis panel, Musk, cpk, aldolase, paraneoplastic panel\par EMG - BARTON, L eye ptosis, R leg weakness\par pt reports that R leg wekness is chronic (6-7 yrs since R knee sugery) will refer to spine if not already seen\par all questions were addressed and answered\par f/u in 1-2  month

## 2020-12-15 ENCOUNTER — APPOINTMENT (OUTPATIENT)
Dept: PULMONOLOGY | Facility: CLINIC | Age: 70
End: 2020-12-15

## 2020-12-22 DIAGNOSIS — Z01.818 ENCOUNTER FOR OTHER PREPROCEDURAL EXAMINATION: ICD-10-CM

## 2020-12-23 ENCOUNTER — APPOINTMENT (OUTPATIENT)
Dept: DISASTER EMERGENCY | Facility: CLINIC | Age: 70
End: 2020-12-23

## 2020-12-24 ENCOUNTER — NON-APPOINTMENT (OUTPATIENT)
Age: 70
End: 2020-12-24

## 2020-12-27 ENCOUNTER — APPOINTMENT (OUTPATIENT)
Dept: SLEEP CENTER | Facility: CLINIC | Age: 70
End: 2020-12-27

## 2021-01-12 ENCOUNTER — APPOINTMENT (OUTPATIENT)
Dept: PULMONOLOGY | Facility: CLINIC | Age: 71
End: 2021-01-12
Payer: COMMERCIAL

## 2021-01-12 ENCOUNTER — LABORATORY RESULT (OUTPATIENT)
Age: 71
End: 2021-01-12

## 2021-01-12 VITALS
BODY MASS INDEX: 29.49 KG/M2 | OXYGEN SATURATION: 96 % | TEMPERATURE: 97.2 F | DIASTOLIC BLOOD PRESSURE: 79 MMHG | HEART RATE: 100 BPM | WEIGHT: 177 LBS | HEIGHT: 65 IN | SYSTOLIC BLOOD PRESSURE: 170 MMHG

## 2021-01-12 PROCEDURE — 99215 OFFICE O/P EST HI 40 MIN: CPT | Mod: 25

## 2021-01-12 PROCEDURE — 36415 COLL VENOUS BLD VENIPUNCTURE: CPT

## 2021-01-12 PROCEDURE — 99072 ADDL SUPL MATRL&STAF TM PHE: CPT

## 2021-01-12 RX ORDER — PREDNISONE 5 MG/1
5 TABLET ORAL DAILY
Qty: 30 | Refills: 2 | Status: DISCONTINUED | COMMUNITY
Start: 2020-10-13 | End: 2021-01-12

## 2021-01-12 NOTE — HISTORY OF PRESENT ILLNESS
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 PMH of DM, arthritis,  knee surgery, obesity, GERD. She is a life long non smoker. No pets at home. She is 5'7", approx 170 lbs\par \par she reports dyspnea is worse with extreme exertion. 6mwt in Dr Araujo office showed desat 88%\par Pt was using inhalers but they did not help so she stopped\par \par CXR showed overinflation\par CTA chest was neg for pE but showed sclerotic changes of her LS spine\par \par PFT July 2020 within normal limit\par Exercise oximetry July 2020 no evidence of desaturation\par april 2020- c/o dyspnea-- prednisone is causing blood sugar elevations\par \par \par july 2020---Patient was seen in the office today complaints of backache--- was referred from Dr. Araujo because on his 6-minute walk test there was some evidence of desaturation------- exercise oximetry done in our office in July 2020 shows no evidence of desaturation-----she does have evidence of sclerotic process in her lumbar spine for which she has been referred for further work-up to spine specialist and hematology oncology----------- I would like to get details of her cardiac work-up from Dr. Clifford's office------ advised to do full pulmonary function testing sleep study through Dr. Araujo's office------ return to my clinic in 2 months time\par \par ----I have kept her on steroids but she felt better on that but was not able to tolerate it and that has been stopped I have advised her to continue with Symbicort in the meantime\par JAN 2021------- STILL C/O DYSPNEA ON EXERTION--NEEDS CARDIAC WORK UP---PSG---ALSO NEEE TO F/U WITH NEUROLOGY TO R/O ANY MUSCULAR CAUSE FOR TIS DYSPNEA ON EXERTION

## 2021-01-12 NOTE — PHYSICAL EXAM
[No Acute Distress] : no acute distress [Normal Oropharynx] : normal oropharynx [III] : Mallampati Class: III [Normal S1, S2] : normal s1, s2 [No Neck Mass] : no neck mass [Clear to Auscultation Bilaterally] : clear to auscultation bilaterally [No Abnormalities] : no abnormalities [Benign] : benign [Normal Gait] : normal gait [No Edema] : no edema [Normal Color/ Pigmentation] : normal color/ pigmentation [No Focal Deficits] : no focal deficits [Oriented x3] : oriented x3

## 2021-01-12 NOTE — DISCUSSION/SUMMARY
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 Baltazar Abbott\par \par Due to backache unable to do CT PET\par \par Sleep study \par ----Diabetes mellitus follow-up with endocrinologist\par f/u in  2 months \par reviewed covid precautions, all questions answered\par \par \par \par Thanks for allowing  me to participate  in the care of this patient.  Patient at this time  will follow  the above mentioned recommendations and return back for follow up visit. If you have any questions  I can be reached  at #[phone] (beeper)  or  [phone] (office).\par \par Merna Little MD, [facility] \par Director, Pulmonary Hypertension Program \par [facility] \par Division of Pulmonary, Critical Care and Sleep Medicine \par  Professor of Medicine \par [employer]\par

## 2021-01-14 LAB
ALBUMIN SERPL ELPH-MCNC: 4.7 G/DL
ALP BLD-CCNC: 91 U/L
ALT SERPL-CCNC: 16 U/L
ANION GAP SERPL CALC-SCNC: 15 MMOL/L
AST SERPL-CCNC: 17 U/L
B2 MICROGLOB SERPL-MCNC: 1.6 MG/L
BASOPHILS # BLD AUTO: 0.02 K/UL
BASOPHILS NFR BLD AUTO: 0.3 %
BILIRUB SERPL-MCNC: 0.2 MG/DL
BUN SERPL-MCNC: 14 MG/DL
CALCIUM SERPL-MCNC: 10.2 MG/DL
CHLORIDE SERPL-SCNC: 104 MMOL/L
CO2 SERPL-SCNC: 24 MMOL/L
CREAT SERPL-MCNC: 0.98 MG/DL
EOSINOPHIL # BLD AUTO: 0.07 K/UL
EOSINOPHIL NFR BLD AUTO: 1 %
GLUCOSE SERPL-MCNC: 126 MG/DL
HCT VFR BLD CALC: 45.3 %
HGB BLD-MCNC: 13.9 G/DL
IGE SER-MCNC: 175 KU/L
IGM SER QL IEP: 25 MG/DL
IMM GRANULOCYTES NFR BLD AUTO: 0.1 %
LYMPHOCYTES # BLD AUTO: 1.92 K/UL
LYMPHOCYTES NFR BLD AUTO: 28.8 %
MAN DIFF?: NORMAL
MCHC RBC-ENTMCNC: 28 PG
MCHC RBC-ENTMCNC: 30.7 GM/DL
MCV RBC AUTO: 91.3 FL
MONOCYTES # BLD AUTO: 0.46 K/UL
MONOCYTES NFR BLD AUTO: 6.9 %
NEUTROPHILS # BLD AUTO: 4.19 K/UL
NEUTROPHILS NFR BLD AUTO: 62.9 %
PLATELET # BLD AUTO: 314 K/UL
POTASSIUM SERPL-SCNC: 4.3 MMOL/L
PROT SERPL-MCNC: 7.6 G/DL
RBC # BLD: 4.96 M/UL
RBC # FLD: 13.7 %
SODIUM SERPL-SCNC: 143 MMOL/L
WBC # FLD AUTO: 6.67 K/UL

## 2021-01-15 ENCOUNTER — APPOINTMENT (OUTPATIENT)
Dept: DISASTER EMERGENCY | Facility: CLINIC | Age: 71
End: 2021-01-15

## 2021-01-16 LAB — SARS-COV-2 N GENE NPH QL NAA+PROBE: NOT DETECTED

## 2021-01-18 ENCOUNTER — FORM ENCOUNTER (OUTPATIENT)
Age: 71
End: 2021-01-18

## 2021-01-21 LAB
ALBUMIN MFR SERPL ELPH: 55.7 %
ALBUMIN SERPL-MCNC: 4.3 G/DL
ALBUMIN/GLOB SERPL: 1.3 RATIO
ALPHA1 GLOB MFR SERPL ELPH: 3.5 %
ALPHA1 GLOB SERPL ELPH-MCNC: 0.3 G/DL
ALPHA2 GLOB MFR SERPL ELPH: 11.2 %
ALPHA2 GLOB SERPL ELPH-MCNC: 0.9 G/DL
B-GLOBULIN MFR SERPL ELPH: 11.6 %
B-GLOBULIN SERPL ELPH-MCNC: 0.9 G/DL
GAMMA GLOB FLD ELPH-MCNC: 1.4 G/DL
GAMMA GLOB MFR SERPL ELPH: 18 %
INTERPRETATION SERPL IEP-IMP: NORMAL
PROT SERPL-MCNC: 7.7 G/DL
PROT SERPL-MCNC: 7.7 G/DL

## 2021-02-09 ENCOUNTER — APPOINTMENT (OUTPATIENT)
Dept: NEUROLOGY | Facility: CLINIC | Age: 71
End: 2021-02-09

## 2021-03-02 ENCOUNTER — APPOINTMENT (OUTPATIENT)
Dept: NEUROLOGY | Facility: CLINIC | Age: 71
End: 2021-03-02

## 2021-03-02 ENCOUNTER — APPOINTMENT (OUTPATIENT)
Dept: PEDIATRIC ALLERGY IMMUNOLOGY | Facility: CLINIC | Age: 71
End: 2021-03-02
Payer: COMMERCIAL

## 2021-03-02 VITALS
TEMPERATURE: 97.3 F | OXYGEN SATURATION: 97 % | WEIGHT: 173 LBS | DIASTOLIC BLOOD PRESSURE: 75 MMHG | BODY MASS INDEX: 28.82 KG/M2 | SYSTOLIC BLOOD PRESSURE: 170 MMHG | HEIGHT: 65 IN | HEART RATE: 83 BPM

## 2021-03-02 DIAGNOSIS — J30.89 OTHER ALLERGIC RHINITIS: ICD-10-CM

## 2021-03-02 DIAGNOSIS — Z01.82 ENCOUNTER FOR ALLERGY TESTING: ICD-10-CM

## 2021-03-02 LAB
CD16+CD56+ CELLS # BLD: 152 /UL
CD16+CD56+ CELLS NFR BLD: 5 %
CD19 CELLS NFR BLD: 515 /UL
CD3 CELLS # BLD: 2122 /UL
CD3 CELLS NFR BLD: 76 %
CD3+CD4+ CELLS # BLD: 1644 /UL
CD3+CD4+ CELLS NFR BLD: 59 %
CD3+CD4+ CELLS/CD3+CD8+ CLL SPEC: 4.18 RATIO
CD3+CD8+ CELLS # SPEC: 394 /UL
CD3+CD8+ CELLS NFR BLD: 14 %
CELLS.CD3-CD19+/CELLS IN BLOOD: 18 %

## 2021-03-02 PROCEDURE — 95004 PERQ TESTS W/ALRGNC XTRCS: CPT

## 2021-03-02 PROCEDURE — 36415 COLL VENOUS BLD VENIPUNCTURE: CPT

## 2021-03-02 PROCEDURE — 99244 OFF/OP CNSLTJ NEW/EST MOD 40: CPT | Mod: 25

## 2021-03-02 PROCEDURE — 99072 ADDL SUPL MATRL&STAF TM PHE: CPT

## 2021-03-02 RX ORDER — ALPRAZOLAM 0.5 MG/1
0.5 TABLET ORAL
Qty: 100 | Refills: 0 | Status: DISCONTINUED | COMMUNITY
Start: 2019-11-13 | End: 2021-03-02

## 2021-03-02 RX ORDER — METFORMIN ER 500 MG 500 MG/1
500 TABLET ORAL
Qty: 60 | Refills: 0 | Status: DISCONTINUED | COMMUNITY
Start: 2020-02-26 | End: 2021-03-02

## 2021-03-02 RX ORDER — GLIMEPIRIDE 1 MG/1
1 TABLET ORAL
Qty: 30 | Refills: 0 | Status: ACTIVE | COMMUNITY
Start: 2020-06-02

## 2021-03-02 RX ORDER — METHOCARBAMOL 750 MG/1
750 TABLET, FILM COATED ORAL
Qty: 60 | Refills: 0 | Status: ACTIVE | COMMUNITY
Start: 2021-01-12

## 2021-03-03 LAB
BASOPHILS # BLD AUTO: 0.02 K/UL
BASOPHILS NFR BLD AUTO: 0.3 %
CH50 SERPL-MCNC: 81 U/ML
DEPRECATED KAPPA LC FREE/LAMBDA SER: 1.16 RATIO
EOSINOPHIL # BLD AUTO: 0.09 K/UL
EOSINOPHIL NFR BLD AUTO: 1.1 %
HCT VFR BLD CALC: 43.1 %
HGB BLD-MCNC: 13.6 G/DL
IGA SER QL IEP: 223 MG/DL
IGG SER QL IEP: 1540 MG/DL
IGM SER QL IEP: 23 MG/DL
IMM GRANULOCYTES NFR BLD AUTO: 0.4 %
KAPPA LC CSF-MCNC: 1.74 MG/DL
KAPPA LC SERPL-MCNC: 2.02 MG/DL
LYMPHOCYTES # BLD AUTO: 3.03 K/UL
LYMPHOCYTES NFR BLD AUTO: 37.9 %
MAN DIFF?: NORMAL
MCHC RBC-ENTMCNC: 28.2 PG
MCHC RBC-ENTMCNC: 31.6 GM/DL
MCV RBC AUTO: 89.4 FL
MONOCYTES # BLD AUTO: 0.57 K/UL
MONOCYTES NFR BLD AUTO: 7.1 %
NEUTROPHILS # BLD AUTO: 4.26 K/UL
NEUTROPHILS NFR BLD AUTO: 53.2 %
PLATELET # BLD AUTO: 290 K/UL
RBC # BLD: 4.82 M/UL
RBC # FLD: 13.5 %
WBC # FLD AUTO: 8 K/UL

## 2021-03-04 LAB
MEV IGG FLD QL IA: 278 AU/ML
MEV IGG+IGM SER-IMP: POSITIVE
MUV AB SER-ACNC: POSITIVE
MUV IGG SER QL IA: 80.5 AU/ML
RUBV IGG FLD-ACNC: 21.2 INDEX
RUBV IGG SER-IMP: POSITIVE
VZV AB TITR SER: NEGATIVE
VZV IGG SER IF-ACNC: 113.3 INDEX

## 2021-03-04 NOTE — CONSULT LETTER
[Dear  ___] : Dear  [unfilled], [Consult Letter:] : I had the pleasure of evaluating your patient, [unfilled]. [Please see my note below.] : Please see my note below. [Consult Closing:] : Thank you very much for allowing me to participate in the care of this patient.  If you have any questions, please do not hesitate to contact me. [Sincerely,] : Sincerely, [FreeTextEntry3] : Orestes Talley MD PGY2 Pediatrics\par \par Kendrick Plummer III  MPH, MD, PhD, FACP, FACAAI, FAAAAI \par , Departments of Medicine and Pediatrics \par [facility] [employer] [facility] \par , [facility] [employer] [facility] [employer] [facility] \par Attending Physician, Division of Allergy & Immunology [facility]\par \par \par

## 2021-03-04 NOTE — PHYSICAL EXAM
[Alert] : alert [Well Nourished] : well nourished [Healthy Appearance] : healthy appearance [No Acute Distress] : no acute distress [Well Developed] : well developed [Normal Pupil & Iris Size/Symmetry] : normal pupil and iris size and symmetry [No Discharge] : no discharge [No Photophobia] : no photophobia [Sclera Not Icteric] : sclera not icteric [Normal TMs] : both tympanic membranes were normal [Normal Nasal Mucosa] : the nasal mucosa was normal [Normal Lips/Tongue] : the lips and tongue were normal [Normal Outer Ear/Nose] : the ears and nose were normal in appearance [Normal Tonsils] : normal tonsils [No Thrush] : no thrush [Boggy Nasal Turbinates] : boggy and/or pale nasal turbinates [Supple] : the neck was supple [Normal Rate and Effort] : normal respiratory rhythm and effort [No Crackles] : no crackles [No Retractions] : no retractions [Bilateral Audible Breath Sounds] : bilateral audible breath sounds [Normal Rate] : heart rate was normal  [Normal S1, S2] : normal S1 and S2 [No murmur] : no murmur [Regular Rhythm] : with a regular rhythm [Soft] : abdomen soft [Not Tender] : non-tender [Not Distended] : not distended [No HSM] : no hepato-splenomegaly [Normal Cervical Lymph Nodes] : cervical [Skin Intact] : skin intact  [No Rash] : no rash [No Skin Lesions] : no skin lesions [No clubbing] : no clubbing [No Edema] : no edema [No Cyanosis] : no cyanosis [No Motor Deficits] : the motor exam was normal [Normal Mood] : mood was normal [Normal Affect] : affect was normal [Alert, Awake, Oriented as Age-Appropriate] : alert, awake, oriented as age appropriate [Conjunctival Erythema] : no conjunctival erythema [Suborbital Bogginess] : no suborbital bogginess (allergic shiners) [Pale mucosa] : no pale mucosa [Pharyngeal erythema] : no pharyngeal erythema [Posterior Pharyngeal Cobblestoning] : no posterior pharyngeal cobblestoning [Exudate] : no exudate [Wheezing] : no wheezing was heard [de-identified] : not dermatographic

## 2021-03-04 NOTE — HISTORY OF PRESENT ILLNESS
[Dust Mites] : dust mites [Trees] : trees [de-identified] : 69yo F Pt with MGUS and multiple other medical conditions incl. T2DM, hypertension, s/p knee joint replacement and chronic rhino-sinusitis with history of polypectomy, now referred due to repeatedly low IgM by her pulmonologists. \par \par >>>Infections:\par In the past used to have frequent UTis, but not over the past few years.\par Chronic rhinosinusitis: once a year sinus infection requiring antibiotic: Augmentin, Cepfrozil, Bactrim. She had 1 ENT surgery due to nasal polyposis more than 10 years ago. Last appointment approximately 2 years ago. She complains of seasonal nasal congestion and post-nasal drip most pronounced in the morning, previously most pronounced in the spring but now continuous. Using Flonase daily, Azelastine twice daily and saline spray, also used decongestant tablet (Zyrtec), but stopped it a week ago, feels that this medications do not fully control her symptoms. Many years ago was tested with skin test at [facility]: positive for dustmite, birch tree, not sure if anything else. Using Aspirin and Tylenol once a month when the weather gets worse and her joints feel stiff. Her sense of smell is impaired.\par Previously used Xopenex in saline for her nose, too.\par \par >>> Allergies:\par - As above, patient would like to be tested for environmental allergens as well as allergen sensitivity\par \par >>Hematology\par -Saw Dr. Abbott in hematology after CT showed sclerotic bone lesion in 3/2020 that was not seen on subsequent bone scan and MRI. She also had mildly abnormal light chains, but immunoelectrophoresis was unremarkable. She was discharged by hematology and told to see us for minimally decreased IgM [de-identified] : none. Also no medication allergies.

## 2021-03-04 NOTE — REVIEW OF SYSTEMS
[Fatigue] : fatigue [Eye Redness] : redness [Eye Itching] : itchy eyes [Rhinorrhea] : rhinorrhea [Nasal Congestion] : nasal congestion [Post Nasal Drip] : post nasal drip [Sneezing] : sneezing [Palpitations] : palpitations [SOB with Exertion] : dyspnea on exertion [Headache] : headache [Joint Pains] : arthralgias [Joint Swelling] : joint swelling  [Dry Skin] : ~L dry skin [Sleep Disturbances] : ~T sleep disturbances [Anxiety] : anxiety [Recurrent Sinus Infections] : recurrent sinus infections [Immunizations are up to date] : Immunizations are up to date [Received Influenza Vaccine this Past Year] : patient has received the Influenza vaccine this past year [Nl] : Hematologic/Lymphatic [Fever] : no fever [Wgt Loss (___ Lbs)] : no recent weight loss [Decreased Appetite] : no decrease in appetite [Nasal Dryness] : no dryness of the nose [Snoring] : no snoring [Mouth Sores] : no mouth sores [Oral Thrush] : no oral thrush [Sore Throat] : no sore throat [Hoarseness] : no hoarseness [Edema] : no edema [SOB at Rest] : no shortness of breath at rest [Cough] : no cough [Pruritus] : no pruritus [Recurrent Throat Infections] : no recurrence of throat infections [Recurrent Bronchitis] : no recurrent bronchitis [Recurrent Ear Infections] : no recurrence or ear infections [Recurrent Skin Infections] : no recurrent skin infections [Recurrent Pneumonia] : no ~T recurrent pneumonia [FreeTextEntry2] : More fatigued over the past few months, difficulty sleeping and anxiety, was using sleeping pills recently (Ambien and Xanax).  [FreeTextEntry3] : Seasonal conjunctivits, using olopatidine locally (antiH) [FreeTextEntry8] : Attributes it to the sinusitis [de-identified] : Hx of dry skin, using moisturizers [FreeTextEntry1] : Incl. Covid-19

## 2021-03-07 LAB
C DIPHTHERIAE AB SER QL: >3 IU/ML
C TETANI IGG SER-ACNC: 3.55 IU/ML

## 2021-03-08 LAB
COMPLEMENT, ALTERNATE PATHWAY (AH50): 69
HAEM INFLU B AB SER-MCNC: 2.61 MG/L
LPT PW BLD-NRATE: NORMAL
MANNAN BINDING LECTIN (MBL): <70 NG/ML

## 2021-03-09 LAB
DEPRECATED S PNEUM 1 IGG SER-MCNC: 59.1 MCG/ML
DEPRECATED S PNEUM12 AB SER-ACNC: 0.7 MCG/ML
DEPRECATED S PNEUM14 AB SER-ACNC: 11.8 MCG/ML
DEPRECATED S PNEUM17 IGG SER IA-MCNC: 71.9 MCG/ML
DEPRECATED S PNEUM18 IGG SER IA-MCNC: 13.8 MCG/ML
DEPRECATED S PNEUM19 IGG SER-MCNC: 12.6 MCG/ML
DEPRECATED S PNEUM19 IGG SER-MCNC: 23.4 MCG/ML
DEPRECATED S PNEUM2 IGG SER-MCNC: 2.4 MCG/ML
DEPRECATED S PNEUM20 IGG SER-MCNC: 1.6 MCG/ML
DEPRECATED S PNEUM22 IGG SER-MCNC: 22.8 MCG/ML
DEPRECATED S PNEUM23 AB SER-ACNC: 35.2 MCG/ML
DEPRECATED S PNEUM3 AB SER-ACNC: 5.4 MCG/ML
DEPRECATED S PNEUM34 IGG SER-MCNC: 4.1 MCG/ML
DEPRECATED S PNEUM4 AB SER-ACNC: 32.4 MCG/ML
DEPRECATED S PNEUM5 IGG SER-MCNC: 10.4 MCG/ML
DEPRECATED S PNEUM6 IGG SER-MCNC: 61.4 MCG/ML
DEPRECATED S PNEUM7 IGG SER-ACNC: 25.2 MCG/ML
DEPRECATED S PNEUM8 AB SER-ACNC: 4.9 MCG/ML
DEPRECATED S PNEUM9 AB SER-ACNC: NORMAL
DEPRECATED S PNEUM9 IGG SER-MCNC: 62.8 MCG/ML
STREPTOCOCCUS PNEUMONIAE SEROTYPE 11A: 1.6 MCG/ML
STREPTOCOCCUS PNEUMONIAE SEROTYPE 15B: 19.8 MCG/ML
STREPTOCOCCUS PNEUMONIAE SEROTYPE 33F: 11.1 MCG/ML

## 2021-03-10 ENCOUNTER — TRANSCRIPTION ENCOUNTER (OUTPATIENT)
Age: 71
End: 2021-03-10

## 2021-03-15 ENCOUNTER — TRANSCRIPTION ENCOUNTER (OUTPATIENT)
Age: 71
End: 2021-03-15

## 2021-03-22 NOTE — PHYSICAL EXAM
[No Acute Distress] : no acute distress [Normal Oropharynx] : normal oropharynx [III] : Mallampati Class: III [No Neck Mass] : no neck mass [Normal S1, S2] : normal s1, s2 [Clear to Auscultation Bilaterally] : clear to auscultation bilaterally [No Abnormalities] : no abnormalities [Benign] : benign [Normal Gait] : normal gait [No Edema] : no edema [Normal Color/ Pigmentation] : normal color/ pigmentation [No Focal Deficits] : no focal deficits [Oriented x3] : oriented x3

## 2021-03-23 ENCOUNTER — APPOINTMENT (OUTPATIENT)
Dept: PULMONOLOGY | Facility: CLINIC | Age: 71
End: 2021-03-23
Payer: COMMERCIAL

## 2021-03-23 VITALS
RESPIRATION RATE: 16 BRPM | HEART RATE: 90 BPM | HEIGHT: 66 IN | SYSTOLIC BLOOD PRESSURE: 163 MMHG | DIASTOLIC BLOOD PRESSURE: 93 MMHG | BODY MASS INDEX: 28.45 KG/M2 | TEMPERATURE: 98.2 F | WEIGHT: 177 LBS

## 2021-03-23 DIAGNOSIS — R06.00 DYSPNEA, UNSPECIFIED: ICD-10-CM

## 2021-03-23 PROCEDURE — 99072 ADDL SUPL MATRL&STAF TM PHE: CPT

## 2021-03-23 PROCEDURE — 82803 BLOOD GASES ANY COMBINATION: CPT

## 2021-03-23 PROCEDURE — 36415 COLL VENOUS BLD VENIPUNCTURE: CPT

## 2021-03-23 PROCEDURE — 99215 OFFICE O/P EST HI 40 MIN: CPT | Mod: 25

## 2021-03-23 PROCEDURE — 36600 WITHDRAWAL OF ARTERIAL BLOOD: CPT | Mod: 59

## 2021-03-23 PROCEDURE — ZZZZZ: CPT

## 2021-03-23 NOTE — HISTORY OF PRESENT ILLNESS
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 PMH of DM, arthritis  ??RA LOW IgM, ,  knee surgery, obesity, GERD. ??/MUSCULAR WEAKNESS[ SEEING DR MAY] --She is a life long non smoker. No pets at home. She is 5'7", approx 170 lbs\par \par she reports dyspnea is worse with extreme exertion. 6mwt in Dr Araujo office showed desat 88%\par Pt was using inhalers but they did not help so she stopped\par \par CXR showed overinflation\par CTA chest was neg for pE but showed sclerotic changes of her LS spine\par \par PFT July 2020 within normal limit\par Exercise oximetry July 2020 no evidence of desaturation\par april 2020- c/o dyspnea-- prednisone is causing blood sugar elevations\par \par \par july 2020---Patient was seen in the office today complaints of backache--- was referred from Dr. Araujo because on his 6-minute walk test there was some evidence of desaturation------- exercise oximetry done in our office in July 2020 shows no evidence of desaturation-----she does have evidence of sclerotic process in her lumbar spine for which she has been referred for further work-up to spine specialist and hematology oncology----------- I would like to get details of her cardiac work-up from Dr. Clifford's office------ advised to do full pulmonary function testing sleep study through Dr. Araujo's office------ return to my clinic in 2 months time\par \par ----I have kept her on steroids but she felt better on that but was not able to tolerate it and that has been stopped I have advised her to continue with Symbicort in the meantime\par JAN 2021------- STILL C/O DYSPNEA ON EXERTION--NEEDS CARDIAC WORK UP---PSG---ALSO NEEE TO F/U WITH NEUROLOGY TO R/O ANY MUSCULAR CAUSE FOR TIS DYSPNEA ON EXERTION \par \par psg- normal study\par \par 3/2021 c/o dyspnea on extreme exertion --HAS arthritis possible rheumatoid arthritis asthma\par having joint pains- h/o OP\par following neurology for neuromuscular weakness\par s/p cardiac eval at [facility] - reports all ok\par She received her covid vac

## 2021-03-26 LAB
ANACR T: NEGATIVE
CCP AB SER IA-ACNC: <8 UNITS
ENA RNP AB SER IA-ACNC: 0.5 AL
ENA SM AB SER IA-ACNC: 0.2 AL
ERYTHROCYTE [SEDIMENTATION RATE] IN BLOOD BY WESTERGREN METHOD: 24 MM/HR
MYOGLOBIN SERPL-MCNC: 38 NG/ML
RF+CCP IGG SER-IMP: NEGATIVE
RHEUMATOID FACT SER QL: <10 IU/ML

## 2021-03-29 ENCOUNTER — RX CHANGE (OUTPATIENT)
Age: 71
End: 2021-03-29

## 2021-04-05 LAB
EJ AB SER QL: NEGATIVE
ENA JO1 AB SER IA-ACNC: <20 UNITS
ENA PM/SCL AB SER-ACNC: <20 UNITS
ENA SM+RNP AB SER IA-ACNC: <20 UNITS
ENA SS-A IGG SER QL: <20 UNITS
FIBRILLARIN AB SER QL: NEGATIVE
KU AB SER QL: NEGATIVE
MDA-5 (P140)(CADM-140): <20 UNITS
MI2 AB SER QL: NEGATIVE
NXP-2 (P140): <20 UNITS
OJ AB SER QL: NEGATIVE
PL12 AB SER QL: NEGATIVE
PL7 AB SER QL: NEGATIVE
SRP AB SERPL QL: NEGATIVE
TIF GAMMA (P155/140): <20 UNITS
U2 SNRNP AB SER QL: NEGATIVE

## 2021-04-20 ENCOUNTER — APPOINTMENT (OUTPATIENT)
Dept: NEUROLOGY | Facility: CLINIC | Age: 71
End: 2021-04-20
Payer: COMMERCIAL

## 2021-04-20 VITALS — TEMPERATURE: 97.3 F

## 2021-04-20 PROCEDURE — 95885 MUSC TST DONE W/NERV TST LIM: CPT | Mod: 59

## 2021-04-20 PROCEDURE — 95937 NEUROMUSCULAR JUNCTION TEST: CPT

## 2021-04-20 PROCEDURE — 95886 MUSC TEST DONE W/N TEST COMP: CPT

## 2021-04-20 PROCEDURE — 99072 ADDL SUPL MATRL&STAF TM PHE: CPT

## 2021-04-20 PROCEDURE — 95909 NRV CNDJ TST 5-6 STUDIES: CPT

## 2021-04-21 ENCOUNTER — APPOINTMENT (OUTPATIENT)
Dept: NEUROLOGY | Facility: CLINIC | Age: 71
End: 2021-04-21
Payer: COMMERCIAL

## 2021-04-21 VITALS
SYSTOLIC BLOOD PRESSURE: 166 MMHG | BODY MASS INDEX: 28.28 KG/M2 | DIASTOLIC BLOOD PRESSURE: 90 MMHG | WEIGHT: 176 LBS | HEART RATE: 81 BPM | HEIGHT: 66 IN

## 2021-04-21 VITALS — TEMPERATURE: 97.5 F

## 2021-04-21 PROCEDURE — 99072 ADDL SUPL MATRL&STAF TM PHE: CPT

## 2021-04-21 PROCEDURE — 99214 OFFICE O/P EST MOD 30 MIN: CPT

## 2021-04-21 NOTE — PHYSICAL EXAM
[FreeTextEntry1] : Patient is awake and alert,pleasant and cooperative with the exam\par Extraocular movements are intact\par No double vision or ptosis with sustained upgaze, no weakness of neck flexors or extensors\par Pupils are equal and reacting to light\par Face is symmetric\par No tremors or bradykinesia\par Slight weakness in right hip flexors rest of the exam is normal\par Deep tendon reflexes 1+ symmetric\par Gait is unremarkable\par \par  [General Appearance - Alert] : alert [General Appearance - In No Acute Distress] : in no acute distress [Oriented To Time, Place, And Person] : oriented to person, place, and time [Impaired Insight] : insight and judgment were intact [Affect] : the affect was normal [Extraocular Movements] : extraocular movements were intact [Hearing Threshold Finger Rub Not Posey] : hearing was normal [] : no respiratory distress [Edema] : there was no peripheral edema [Abdomen Soft] : soft [Abnormal Walk] : normal gait [Skin Color & Pigmentation] : normal skin color and pigmentation

## 2021-04-21 NOTE — HISTORY OF PRESENT ILLNESS
[FreeTextEntry1] : This is a 70-year-old right-handed female who presents with chief complaints of dyspnea on exertion\par She states that she cannot walk even one block or a flight of steps without being out of breath. She has been tiring easily and reports aches and pains in her joints back leg and knee. She has followed up with pulmonologist and suggestion was made to rule out myasthenia gravis.\par \par She does note some fluctuations and feeling tired easily. No difficulty with swallowing. Speech is okay but after a lot of talking she needs to catch her breath. She denies any double vision but states that when she wakes up in the morning her left eye shut than she needs to physically dependent. This has been going on for about 3-4 months.\par She denies any changes in the color of her urine denies myalgias. Her balance is okay no change in bowel habits. She is noticing increased frequency of urination and some stress incontinence but feels that it is age related.\par No changes in memory\par \par Review of systems-a complete review of systems is performed and is negative except as listed in history of present illness\par \par Past medical history\par Right knee replacement\par Hypertension\par Diabetes for 10 years\par Anxiety\par \par Family history of diabetes and colon cancer no neurological conditions,\par \par Interval history - patient presents in follow-up today-she continues to experience dyspnea on exertion even after a short walk.  She states that some days she wakes up where the left eye needs to be opened manually this does not happen every day.  She denies any double vision or any changes in her speech.  Patient is here to follow-up on results of the labs and EMG study

## 2021-04-21 NOTE — DATA REVIEWED
[de-identified] : MRI lumbar spine without contrast was done March 2020\par Impression\par extensive multilevel degenerative disc disease throughout lumbar spine with bilateral facet and ligamentum flavum hypertrophy. No bone marrow signal abnormality suspicious for an infectious process\par Mild spinal canal stenosis at L3-L4, L4-L5 and L5-S1\par At L2-L3 level there is posterior disc osteophyte complex. There is impression upon the ventral aspect of the thecal sac with severe associated spinal canal stenosis. There is mild bilateral neuroforaminal stenosis.\par At T12-L1 level there is right paracentral disc herniation or extrusion demonstrating inferior margin of disc material along the dorsal aspect of L1 superior endplate. There is impression upon the ventral aspect of the thecal sac and spinal cord there is severe associated spinal canal stenosis. [de-identified] : Folate 19\par Vitamin B12 greater than 2000\par Aldolase 6.6\par Acetylcholine receptor antibody modulating, blocking, binding all within normal limits\par  \par ESR 23 \par striated muscle antibody screen negative\par \par EMG study-abnormal\par There is evidence for asymmetric sensorimotor axonal polyneuropathy consistent with the patient's history of uncontrolled diabetes mellitus\par There is bilateral moderate ulnar neuropathy at the elbow with evidence of chronic denervation on the needle EMG of the right FDI muscle\par Repetitive nerve stimulation studies show amplitude decrement in some trials raising suspicion for a neuromuscular junction disorder such as myasthenia gravis however findings were not differential definite due to movement artifacts and poor tolerance by the patient despite several trials\par

## 2021-04-21 NOTE — DISCUSSION/SUMMARY
[FreeTextEntry1] : This is a 70-year-old female who presents with chief complaints of dyspnea on exertion. She is here to rule out myasthenia gravis or neuromuscular disease. Her exam is nonfocal other than right hip flexor weakness. I do have report of MRI lumbar spine which shows T12-L1 disc herniation with impression on the ventral aspect of the thecal sac and spinal cord. Also there is spinal stenosis.\par She reports difficulty opening the left eye on awakening in the morning, no pupillary abnormality no eyelid opening abnormality seen today\par Plan\par Check myasthenia gravis panel, Musk, cpk, aldolase, thyroid TPO SPEP all within normal limits\par We will get MRI of the brain and MRA head as the patient is reporting ptosis.  To rule out intracranial lesions or aneurysm\par EMG -difficult study, will have her follow with neuromuscular specialist Dr. Sun\par pt reports that R leg weakness is chronic (6-7 yrs since R knee surgery) will refer to spine if not already seen\par all questions were addressed and answered\par

## 2021-04-26 ENCOUNTER — LABORATORY RESULT (OUTPATIENT)
Age: 71
End: 2021-04-26

## 2021-04-26 ENCOUNTER — APPOINTMENT (OUTPATIENT)
Dept: PEDIATRIC ALLERGY IMMUNOLOGY | Facility: CLINIC | Age: 71
End: 2021-04-26
Payer: COMMERCIAL

## 2021-04-26 DIAGNOSIS — Z13.228 ENCOUNTER FOR SCREENING FOR OTHER SUSPECTED ENDOCRINE DISORDER: ICD-10-CM

## 2021-04-26 DIAGNOSIS — Z13.0 ENCOUNTER FOR SCREENING FOR OTHER SUSPECTED ENDOCRINE DISORDER: ICD-10-CM

## 2021-04-26 DIAGNOSIS — Z13.29 ENCOUNTER FOR SCREENING FOR OTHER SUSPECTED ENDOCRINE DISORDER: ICD-10-CM

## 2021-04-26 PROCEDURE — 36415 COLL VENOUS BLD VENIPUNCTURE: CPT

## 2021-04-26 PROCEDURE — 99072 ADDL SUPL MATRL&STAF TM PHE: CPT

## 2021-04-29 ENCOUNTER — TRANSCRIPTION ENCOUNTER (OUTPATIENT)
Age: 71
End: 2021-04-29

## 2021-05-03 ENCOUNTER — TRANSCRIPTION ENCOUNTER (OUTPATIENT)
Age: 71
End: 2021-05-03

## 2021-05-24 ENCOUNTER — RX RENEWAL (OUTPATIENT)
Age: 71
End: 2021-05-24

## 2021-05-26 ENCOUNTER — RESULT REVIEW (OUTPATIENT)
Age: 71
End: 2021-05-26

## 2021-05-26 ENCOUNTER — OUTPATIENT (OUTPATIENT)
Dept: OUTPATIENT SERVICES | Facility: HOSPITAL | Age: 71
LOS: 1 days | End: 2021-05-26
Payer: COMMERCIAL

## 2021-05-26 ENCOUNTER — APPOINTMENT (OUTPATIENT)
Dept: MRI IMAGING | Facility: CLINIC | Age: 71
End: 2021-05-26
Payer: COMMERCIAL

## 2021-05-26 DIAGNOSIS — H02.402 UNSPECIFIED PTOSIS OF LEFT EYELID: ICD-10-CM

## 2021-05-26 PROCEDURE — 70544 MR ANGIOGRAPHY HEAD W/O DYE: CPT | Mod: 26,59

## 2021-05-26 PROCEDURE — 70544 MR ANGIOGRAPHY HEAD W/O DYE: CPT

## 2021-05-26 PROCEDURE — 70553 MRI BRAIN STEM W/O & W/DYE: CPT | Mod: 26

## 2021-05-26 PROCEDURE — A9585: CPT

## 2021-05-26 PROCEDURE — 70553 MRI BRAIN STEM W/O & W/DYE: CPT

## 2021-06-07 ENCOUNTER — APPOINTMENT (OUTPATIENT)
Dept: PEDIATRIC ALLERGY IMMUNOLOGY | Facility: CLINIC | Age: 71
End: 2021-06-07
Payer: COMMERCIAL

## 2021-06-07 DIAGNOSIS — Z91.09 OTHER ALLERGY STATUS, OTHER THAN TO DRUGS AND BIOLOGICAL SUBSTANCES: ICD-10-CM

## 2021-06-07 DIAGNOSIS — I10 ESSENTIAL (PRIMARY) HYPERTENSION: ICD-10-CM

## 2021-06-07 DIAGNOSIS — R76.8 OTHER SPECIFIED ABNORMAL IMMUNOLOGICAL FINDINGS IN SERUM: ICD-10-CM

## 2021-06-07 DIAGNOSIS — J30.2 OTHER SEASONAL ALLERGIC RHINITIS: ICD-10-CM

## 2021-06-07 PROCEDURE — 99443: CPT

## 2021-06-07 RX ORDER — AZELASTINE HYDROCHLORIDE 137 UG/1
0.1 SPRAY, METERED NASAL
Qty: 1 | Refills: 3 | Status: ACTIVE | COMMUNITY
Start: 2021-06-07 | End: 1900-01-01

## 2021-06-07 RX ORDER — FEXOFENADINE HYDROCHLORIDE 180 MG/1
180 TABLET ORAL DAILY
Qty: 1 | Refills: 3 | Status: ACTIVE | COMMUNITY
Start: 2021-06-07

## 2021-06-07 RX ORDER — CARVEDILOL 6.25 MG/1
6.25 TABLET, FILM COATED ORAL
Refills: 0 | Status: ACTIVE | COMMUNITY
Start: 2021-06-07

## 2021-06-07 RX ORDER — FLUTICASONE PROPIONATE 50 UG/1
50 SPRAY, METERED NASAL DAILY
Qty: 1 | Refills: 3 | Status: ACTIVE | COMMUNITY
Start: 2020-12-09

## 2021-06-07 NOTE — REVIEW OF SYSTEMS
[Fatigue] : fatigue [Fever] : no fever [Wgt Loss (___ Lbs)] : no recent weight loss [Decreased Appetite] : no decrease in appetite [Eye Redness] : redness [Eye Itching] : itchy eyes [Rhinorrhea] : rhinorrhea [Nasal Dryness] : no dryness of the nose [Nasal Congestion] : nasal congestion [Snoring] : no snoring [Mouth Sores] : no mouth sores [Oral Thrush] : no oral thrush [Sore Throat] : no sore throat [Hoarseness] : no hoarseness [Post Nasal Drip] : post nasal drip [Sneezing] : sneezing [Edema] : no edema [Palpitations] : palpitations [SOB at Rest] : no shortness of breath at rest [SOB with Exertion] : dyspnea on exertion [Cough] : no cough [Headache] : headache [Joint Pains] : arthralgias [Joint Swelling] : joint swelling  [Pruritus] : no pruritus [Dry Skin] : ~L dry skin [Sleep Disturbances] : ~T sleep disturbances [Anxiety] : anxiety [Recurrent Sinus Infections] : recurrent sinus infections [Recurrent Throat Infections] : no recurrence of throat infections [Recurrent Bronchitis] : no recurrent bronchitis [Recurrent Ear Infections] : no recurrence or ear infections [Recurrent Skin Infections] : no recurrent skin infections [Recurrent Pneumonia] : no ~T recurrent pneumonia [Nl] : Genitourinary [FreeTextEntry2] : More fatigued over the past few months, difficulty sleeping and anxiety, was using sleeping pills recently (Ambien and Xanax).  [FreeTextEntry8] : Attributes it to the sinusitis [de-identified] : Hx of dry skin, using moisturizers

## 2021-06-07 NOTE — HISTORY OF PRESENT ILLNESS
[de-identified] : 69yo F Pt with history of minimally elevated kappa light chain and multiple other medical conditions incl. T2DM, hypertension, s/p knee joint replacement frequently using aspirin for pain and chronic rhino-sinusitis with history of polypectomy, as well as seasonal conjunctivitis, now referred due to repeatedly low IgM on serum electrophoresis by her pulmonologists. She was previously assessed by hematology, too. Last seen 3/2021\par \par Since last visit, she denies any interval infections or Abx use. She states that she has been developing more frequent sinus pressure. She has not followed up with her ENT. she is intermittently using flonase and po antihistamines (zyrtec) with minimal relief. Skin testing from 3/2021 was positive to dust mite. her other nasal (stuffy, runny nose, sneezing) and ocular (itchy watery eyes) are at baseline.\par \par she had repeat labs done end of 4/2021 and is here to discuss results.\par \par immune system evaluation to date showed:\par cellular immune eval:\par unremarkable CBC w/ diff\par elevated CD3 & CD4 T, CD19 B with otherwise unremarkable CD8 T & CD16/56 NK cell counts\par unremarkable lymphocyte proliferation to mitogens\par \par humoral immune eval:\par decreased IgM, minimally elevated kappa light chain, with otherwise unremarkable IgG, IgA, lambda light chain\par positive titers to MMR, diphtheria, tetanus, Hib,pneumococcus\par negative titers to varicella\par \par complement eval:\par decreased MBL with otherwise unremarkable CH50, AH50

## 2021-06-07 NOTE — END OF VISIT
[FreeTextEntry3] : Verbal consent was obtained from patient for telephonic services due to the COVID-19 pandemic.The patient understands the risks of these platforms and limitations of telemedicine with regards to diagnosis and treatment without the ability to perform a thorough physical examination.\par

## 2021-06-17 RX ORDER — ALBUTEROL SULFATE 90 UG/1
108 (90 BASE) INHALANT RESPIRATORY (INHALATION)
Qty: 1 | Refills: 1 | Status: ACTIVE | COMMUNITY
Start: 2021-06-17 | End: 1900-01-01

## 2021-06-17 RX ORDER — ALBUTEROL SULFATE 90 UG/1
108 (90 BASE) AEROSOL, METERED RESPIRATORY (INHALATION)
Qty: 1 | Refills: 2 | Status: DISCONTINUED | COMMUNITY
Start: 2020-04-28 | End: 2021-06-17

## 2021-06-28 ENCOUNTER — APPOINTMENT (OUTPATIENT)
Dept: NEUROLOGY | Facility: CLINIC | Age: 71
End: 2021-06-28
Payer: COMMERCIAL

## 2021-06-28 VITALS
DIASTOLIC BLOOD PRESSURE: 78 MMHG | SYSTOLIC BLOOD PRESSURE: 134 MMHG | HEART RATE: 79 BPM | WEIGHT: 178 LBS | BODY MASS INDEX: 28.61 KG/M2 | HEIGHT: 66 IN

## 2021-06-28 PROCEDURE — 99244 OFF/OP CNSLTJ NEW/EST MOD 40: CPT

## 2021-06-28 PROCEDURE — 99072 ADDL SUPL MATRL&STAF TM PHE: CPT

## 2021-06-29 RX ORDER — DIAZEPAM 10 MG/1
10 TABLET ORAL
Qty: 5 | Refills: 0 | Status: COMPLETED | COMMUNITY
Start: 2021-05-04

## 2021-06-29 NOTE — DISCUSSION/SUMMARY
[FreeTextEntry1] : Opinion–the patient's history is vague with respect to the diagnosis of myasthenia gravis of ocular type and the eye sticking sensation with closure and remaining open for the rest of the day does not confirm the presence of eyelid ptosis of ocular myasthenia and there is no diplopia or dysarthria or dysphagia and I am not certain regarding the onset of her shortness of breath on exertion.  I advised her to obtain the antibody testing results as soon as possible and forwarded to my office and if it is indeed positive I will challenge her with Mestinon and repeat electrophysiologic testing particularly of the orbicularis oculi and nasalis muscle.\par \par She has some features of diabetic neuropathy with absent ankle reflexes but there is no symmetric distal sensory loss and there is no posterior column dysfunction.  Good diabetic control was emphasized including that of her hypertension and must remain under the care of her hematologist for monoclonal protein abnormality as it can lead to lymphoproliferative disorder and she understands.\par \par Education and counseling was provided including a follow-up evaluation and she understands.

## 2021-06-29 NOTE — DATA REVIEWED
[de-identified] : Lumbar spine MRI was reviewed showing extensive degenerative changes without any significant thecal sac compression or radicular disease.  I also reviewed [de-identified] : I reviewed the electrophysiologic studies undertaken in my office which did not reveal any convincing evidence of decremental response though there was suggestion but there were artifacts because of poor cooperation by the patient and therefore inconclusive. [de-identified] : I reviewed her electronic medical records with multiple investigations including immunofixation but I do not have the antibody testing for myasthenia gravis available though the patient believes that it was done and will forward the report as she has had in her possession.I I also reviewed the neurological consultation by Dr. Delgadillo and noted the contents and her concern regarding neuromuscular disorder.

## 2021-06-29 NOTE — PHYSICAL EXAM
[FreeTextEntry1] : General examination is unremarkable.Vital signs are recorded to be normal and her respiratory rate is 16/min and there is no labored breathing.  There is no carotid bruit, thyromegaly or lymphadenopathy.  Chest is clear and heart sounds are normal.  Abdomen is soft without tenderness.  Pedal pulsations are normal.  Cervical and lumbar spine is without tenderness muscle spasm and reveals normal range of motion.  There is no leg edema.  Temporal artery pulsations are normal.  There is no bony tenderness.\par \par Neurological examination besides absent ankle reflexes is completely normal as delineated. [General Appearance - Alert] : alert [Oriented To Time, Place, And Person] : oriented to person, place, and time [General Appearance - In No Acute Distress] : in no acute distress [Impaired Insight] : insight and judgment were intact [Affect] : the affect was normal [Person] : oriented to person [Place] : oriented to place [Time] : oriented to time [Concentration Intact] : normal concentrating ability [Naming Objects] : no difficulty naming common objects [Visual Intact] : visual attention was ~T not ~L decreased [Repeating Phrases] : no difficulty repeating a phrase [Writing A Sentence] : no difficulty writing a sentence [Fluency] : fluency intact [Reading] : reading intact [Comprehension] : comprehension intact [Past History] : adequate knowledge of personal past history [Cranial Nerves Optic (II)] : visual acuity intact bilaterally,  visual fields full to confrontation, pupils equal round and reactive to light [Cranial Nerves Trigeminal (V)] : facial sensation intact symmetrically [Cranial Nerves Oculomotor (III)] : extraocular motion intact [Cranial Nerves Facial (VII)] : face symmetrical [Cranial Nerves Vestibulocochlear (VIII)] : hearing was intact bilaterally [Cranial Nerves Glossopharyngeal (IX)] : tongue and palate midline [Cranial Nerves Hypoglossal (XII)] : there was no tongue deviation with protrusion [Cranial Nerves Accessory (XI - Cranial And Spinal)] : head turning and shoulder shrug symmetric [Motor Tone] : muscle tone was normal in all four extremities [Motor Strength] : muscle strength was normal in all four extremities [No Muscle Atrophy] : normal bulk in all four extremities [Sensation Tactile Decrease] : light touch was intact [Abnormal Walk] : normal gait [Balance] : balance was intact [Past-pointing] : there was no past-pointing [Tremor] : no tremor present [2+] : Ankle jerk left 2+ [Plantar Reflex Right Only] : normal on the right [Plantar Reflex Left Only] : normal on the left

## 2021-06-29 NOTE — HISTORY OF PRESENT ILLNESS
[FreeTextEntry1] : Ms. Lind is a 70-year-old -American lady referred by Dr. Delgadillo for neuromuscular consultation and also referred by Dr. Lopez.\par \par The patient is stated that she has shortness of breath on exertion since approximately 6 to 8 months and is not progressively getting worse and was extensively investigated by Dr. Little and there was no primary pulmonary disease.  Her blood tests had revealed changes in the IgM monoclonal proteins and was evaluated by hematologist and hematology oncologist who repeated her immunofixation and is being monitored.  There is no history of multiple myeloma or any lymphoproliferative disorder.  She has history of diabetes for last 10 years treated with Janumet XR 1 tablet daily, glyburide 1 tablet daily and her recent hemoglobin A1c is 7.4.  She stated that currently she has shortness of breath which she has not started feeling since she was treated with Coreg and wants to discontinue it and I advised her to discuss it with her internist.\par \par She was evaluated by Dr. Delgadillo and had electrodiagnostic studies in my office which had revealed some evidence of decremental response but had several artifacts and I could not definitely establish the diagnosis of neuromuscular junction disorder and discussed the matter with Dr. Delgadillo and advised her to obtain antibody testing for myasthenia gravis.\par \par She also stated that she has left eye closure when she wakes up in the morning and finds it is sticky and forces it to open with her fingers and once she does it the eye remains open and normal and there is no recurrence of ptosis as the day passes and particularly there is no history of ptosis in the evening hours and no diplopia or dysarthria or dysphagia and only has exertional dyspnea.  She denied any neck muscle weakness or head drop and there is no focal or lateralized weakness.  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  She had right knee replacement 12 years ago and denied any Covid infection and had no history of tuberculosis chronic pulmonary disease and has been vaccinated.\par \par She is  with 2 grownup children and her father  of stomach cancer and mother  of colonic cancer and by profession she is a pharmacist and has no toxic habits.  I reviewed her medications and allergies.  There is history of left eye closure there is history of droopy left eye

## 2021-06-29 NOTE — REVIEW OF SYSTEMS
[Shortness Of Breath] : shortness of breath [SOB on Exertion] : shortness of breath during exertion [As Noted in HPI] : as noted in HPI [Negative] : Integumentary [de-identified] : There is history of left eye closure in the morning and she feels sticky sensation and when she opens it it does not drop again and there is no associated diplopia or visual obscuration.

## 2021-07-12 ENCOUNTER — APPOINTMENT (OUTPATIENT)
Dept: PULMONOLOGY | Facility: CLINIC | Age: 71
End: 2021-07-12
Payer: COMMERCIAL

## 2021-07-12 ENCOUNTER — MED ADMIN CHARGE (OUTPATIENT)
Age: 71
End: 2021-07-12

## 2021-07-12 ENCOUNTER — TRANSCRIPTION ENCOUNTER (OUTPATIENT)
Age: 71
End: 2021-07-12

## 2021-07-12 VITALS
DIASTOLIC BLOOD PRESSURE: 80 MMHG | OXYGEN SATURATION: 99 % | HEIGHT: 66 IN | BODY MASS INDEX: 28.77 KG/M2 | HEART RATE: 88 BPM | TEMPERATURE: 97.4 F | WEIGHT: 179 LBS | SYSTOLIC BLOOD PRESSURE: 147 MMHG

## 2021-07-12 PROCEDURE — 36415 COLL VENOUS BLD VENIPUNCTURE: CPT

## 2021-07-12 PROCEDURE — 99072 ADDL SUPL MATRL&STAF TM PHE: CPT

## 2021-07-12 PROCEDURE — 99215 OFFICE O/P EST HI 40 MIN: CPT | Mod: 25

## 2021-07-12 PROCEDURE — 96372 THER/PROPH/DIAG INJ SC/IM: CPT

## 2021-07-12 RX ORDER — CYANOCOBALAMIN 1000 UG/ML
1000 INJECTION INTRAMUSCULAR; SUBCUTANEOUS
Qty: 0 | Refills: 0 | Status: COMPLETED | OUTPATIENT
Start: 2021-07-12

## 2021-07-12 RX ADMIN — CYANOCOBALAMIN 1 MCG/ML: 1000 INJECTION INTRAMUSCULAR; SUBCUTANEOUS at 00:00

## 2021-07-12 NOTE — DISCUSSION/SUMMARY
[FreeTextEntry1] : -----assessment and plan----\par 71 yo referred for chronic dyspnea\par Not clear if she has any specific pulmonary disorders she did not desaturate on exercise oximetry in July 2020---echo normal---due to backache unable to do cpet\par \par 1-- FEELS BETETR THAN BEFORE HAS CUT DOWN HER WORKING HOURS\par 2 -   muscle testing r/o  myasthenia -NEUROLOGY DR MAY \par 3_  selective IgM deficiency (SIGMD)--f/u with Dr Plummer\par \par 4 Continue Symbicort and Ventolin for now\par 5 referred to rheum for  ??/RHEUMATOID ARTHRITIS---\par 6- she is up to date w/covid vac\par 7- f/u iDR VIRGIE-----  R/O INDOELNT MYELOMA  --R/O POEMS SYNDROME\par 8- n 4 m\par \par \par \par Thanks for allowing  me to participate  in the care of this patient.  Patient at this time  will follow  the above mentioned recommendations and return back for follow up visit. If you have any questions  I can be reached  at #[phone] (beeper)  or  [phone] (office).\par \par Merna Little MD, FCCP \par Director, Pulmonary Hypertension Program \par [facility] \par Division of Pulmonary, Critical Care and Sleep Medicine \par  Professor of Medicine \par [employer]\par

## 2021-07-12 NOTE — HISTORY OF PRESENT ILLNESS
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 PMH of DM, arthritis  ??RA LOW IgM, ,  knee surgery, obesity, GERD. ??/MUSCULAR WEAKNESS[ SEEING DR MAY] --She is a life long non smoker. No pets at home. She is 5'7", approx 170 lbs\par \par she reports dyspnea is worse with extreme exertion. 6mwt in Dr Araujo office showed desat 88%\par Pt was using inhalers but they did not help so she stopped\par \par CXR showed overinflation\par CTA chest was neg for pE but showed sclerotic changes of her LS spine\par \par PFT July 2020 within normal limit\par Exercise oximetry July 2020 no evidence of desaturation\par april 2020- c/o dyspnea-- prednisone is causing blood sugar elevations\par \par \par july 2020---Patient was seen in the office today complaints of backache--- was referred from Dr. Araujo because on his 6-minute walk test there was some evidence of desaturation------- exercise oximetry done in our office in July 2020 shows no evidence of desaturation-----she does have evidence of sclerotic process in her lumbar spine for which she has been referred for further work-up to spine specialist and hematology oncology----------- I would like to get details of her cardiac work-up from Dr. Clifford's office------ advised to do full pulmonary function testing sleep study through Dr. Araujo's office------ return to my clinic in 2 months time\par \par ----I have kept her on steroids but she felt better on that but was not able to tolerate it and that has been stopped I have advised her to continue with Symbicort in the meantime\par JAN 2021------- STILL C/O DYSPNEA ON EXERTION--NEEDS CARDIAC WORK UP---PSG---ALSO NEEE TO F/U WITH NEUROLOGY TO R/O ANY MUSCULAR CAUSE FOR TIS DYSPNEA ON EXERTION \par \par psg- normal study\par \par 3/2021 c/o dyspnea on extreme exertion --HAS arthritis possible rheumatoid arthritis asthma\par having joint pains- h/o OP\par following neurology for neuromuscular weakness\par s/p cardiac eval at [facility] - reports all ok\par She received her covid vac\par \par 7/2021 following allergist Dr Plummer for low IGM and Dr Sun to r/o Mysthenia Gravis\par Has seen heme Dr Abbott in past for  IgM monoclonal proteins   r/o POEMS SYNDROME\par She has no current resp complaints\par She is a nonsmoker\par She is up to date w/covid vac

## 2021-07-13 ENCOUNTER — NON-APPOINTMENT (OUTPATIENT)
Age: 71
End: 2021-07-13

## 2021-07-28 ENCOUNTER — NON-APPOINTMENT (OUTPATIENT)
Age: 71
End: 2021-07-28

## 2021-08-18 ENCOUNTER — APPOINTMENT (OUTPATIENT)
Dept: NEUROLOGY | Facility: CLINIC | Age: 71
End: 2021-08-18
Payer: COMMERCIAL

## 2021-08-18 VITALS
WEIGHT: 176 LBS | HEIGHT: 66 IN | BODY MASS INDEX: 28.28 KG/M2 | HEART RATE: 76 BPM | DIASTOLIC BLOOD PRESSURE: 77 MMHG | SYSTOLIC BLOOD PRESSURE: 141 MMHG

## 2021-08-18 DIAGNOSIS — H02.402 UNSPECIFIED PTOSIS OF LEFT EYELID: ICD-10-CM

## 2021-08-18 DIAGNOSIS — D47.2 MONOCLONAL GAMMOPATHY: ICD-10-CM

## 2021-08-18 PROCEDURE — 99214 OFFICE O/P EST MOD 30 MIN: CPT

## 2021-08-19 PROBLEM — D47.2 MGUS (MONOCLONAL GAMMOPATHY OF UNKNOWN SIGNIFICANCE): Status: ACTIVE | Noted: 2020-08-25

## 2021-08-19 PROBLEM — H02.402 PTOSIS OF LEFT EYELID: Status: ACTIVE | Noted: 2021-04-21

## 2021-08-19 NOTE — PHYSICAL EXAM
[FreeTextEntry1] : General examination is unremarkable.  There is no carotid bruit, thyromegaly or lymphadenopathy.  Neck is supple with full range of motion and there is no spine tenderness or muscle spasm.  Chest is clear and heart sounds are normal.  Temporal artery pulsations are normal and there is no scalp tenderness.  Pedal pulsations are preserved and straight leg raising test is negative.  There is no Tinel sign at wrist or elbow.\par \par Eye examination is unremarkable.  Pupils are symmetric brisk with full extraocular movements and there is no nystagmus and no diplopia.  There is no keratoconjunctivitis.  I could not see any abnormalities in the anterior or posterior chamber of the eyes.  Discs are normal and visual fields are full and extraocular movements are full without nystagmus.\par \par Neurological examination today revealed normal memory language and cognitive functions and she stated that she is having low energy and feels generally tired but denied any history of anxiety or depression except some family issues with her daughter but they are within the realm of family disputes.  Cranial nerve examination is completely normal as delineated including lack of any facial weakness and bulbar functions are intact with normal hearing and facial sensations.  Motor evaluation revealed minimal weakness of the first dorsal interosseous and abductor digiti minimi whereas all other hand and forearm muscles are completely normal including flexor carpi ulnaris.  Leg muscles are 5/5.  Deep tendon reflexes are 1+ in the upper extremities except for 1.5+ in the triceps there is no Romy sign and coordination is normal.  Knee and ankle reflexes are 1+ there is no Babinski sign and sensory evaluation in the lower extremities completely normal and there is no dermatomal or distal sensory loss.  Gait is normal and Romberg sign is negative.

## 2021-08-19 NOTE — END OF VISIT
Britt Rodas is a 44 year old female presenting with  Sinus congestion since October 8th,harsh cough with thick brownish mucus,throat irritation,jaw and sinus pain,fatigue, for 4 days.  Denies known Latex allergy or symptoms of Latex sensitivity.  Medications, allergies and tobacco use reviewed.  Patient would like communication of their results via:    Temperature 97.9 °F (36.6 °C), temperature source Tympanic, weight 103.9 kg, last menstrual period 11/15/2019.        Cell Phone:   Telephone Information:   Mobile [phone]     Okay to leave a message containing results? Yes       [Time Spent: ___ minutes] : I have spent [unfilled] minutes of time on the encounter. [>50% of the face to face encounter time was spent on counseling and/or coordination of care for ___] : Greater than 50% of the face to face encounter time was spent on counseling and/or coordination of care for [unfilled]

## 2021-08-19 NOTE — HISTORY OF PRESENT ILLNESS
[FreeTextEntry1] : Ms. Muir is a 70-year-old lady who was originally evaluated in my office having been referred by Dr. Delgadillo and was thoroughly investigated for possibility of ocular myasthenia gravis but her repetitive nerve stimulation test was unremarkable and antibody testing for myasthenia was negative.  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  As a stated with respect to ocular myasthenia her antibody testing was negative and repetitive nerve stimulation test were not confirmatory.  She stated that she continues to have sporadic closure of the left eye only in the morning and when she forces it to open with her fingers it remains normal and there has been no history of ptosis as the day passes and denied any diplopia dysarthria dysphagia or dyspnea.  There is no focal or lateralized weakness other than aforementioned and is being followed by her ophthalmologist.  There is history of low IgM and has been investigated by a hematologist and has an appointment this week to see a hematologist for reevaluation of low IgM levels.  A detailed review of system was otherwise unremarkable.\par \par She claims of being slightly tired with low energy is and had a cardiological evaluation who changed her medication for hypertension and put her on verapamil which caused morning headaches whereas Advil or Tylenol relieves the pain and the headache is nonspecific nonthrobbing.  There is no history of nausea vomiting dizziness lightheadedness visual obscurations.\par \par I had reviewed the recent blood tests and filed it in the electronic medical records which were already reviewed during the past evaluations.  She was advised that the hematologist may do blood tests and I would like a report and she promised to do so.\par \par I reviewed her medications and allergies and there is no interim past medical history.\par \par I reviewed her recent blood tests and filed it in the electronic medical records

## 2021-08-19 NOTE — DISCUSSION/SUMMARY
[facility]:  Please call [phone] to cancel and/or reschedule your appointment   Please call [phone] with any problems or questions regarding your Warfarin therapy.     [FreeTextEntry1] : Opinion–\par \par The patient has no clear evidence of ocular myasthenia gravis and is antibody negative with unremarkable and unconfirmed repetitive nerve stimulation test but her ocular symptoms are not typical of myasthenia gravis and was advised to continue with her ophthalmologist.\par \par Patient has mild ulnar neuropathy at elbow without any discernible symptoms and has minimal weakness of the hand muscle and was advised that in view of her ulnar neuropathy by electrophysiologic testing she must see a hand specialist to discuss the ulnar entrapment but she has deferred and was also advised to repeat the electrophysiologic studies to see if there is any progression which she has also deferred at the present time and will consider during the next follow-up visit.\par \par The patient has low IgM but without any clearly discernible monoclonal bands in the serum protein electrophoresis and has an appointment with her hematologist tomorrow and was advised to forward the recent blood test reports following the hematology consultation.  She does not have any evidence of peripheral neuropathy of monoclonal gammopathy.\par \par Counseling was advised because of emotionality in family relationships whereas to keep in contact with me by phone if there is any continued ptosis diplopia or dysarthria or dysphagia and any symptom worsening.  She appears to have good insight and judgment and was advised to see me in follow-up in 3 months or on a as needed basis. today

## 2021-08-19 NOTE — REVIEW OF SYSTEMS
[Feeling Poorly] : feeling poorly [Hand Weakness] :  hand weakness [Tingling] : tingling [Tension Headache] : tension-type headaches [As Noted in HPI] : as noted in HPI [Negative] : Integumentary

## 2021-08-19 NOTE — DATA REVIEWED
[de-identified] : I reviewed her blood test reports that was brought in by the patient but these have already been reviewed in the past.  General examination is unremarkable.

## 2021-09-27 ENCOUNTER — APPOINTMENT (OUTPATIENT)
Dept: PULMONOLOGY | Facility: CLINIC | Age: 71
End: 2021-09-27
Payer: COMMERCIAL

## 2021-09-27 ENCOUNTER — LABORATORY RESULT (OUTPATIENT)
Age: 71
End: 2021-09-27

## 2021-09-27 VITALS
BODY MASS INDEX: 28.61 KG/M2 | DIASTOLIC BLOOD PRESSURE: 80 MMHG | RESPIRATION RATE: 16 BRPM | SYSTOLIC BLOOD PRESSURE: 154 MMHG | HEART RATE: 69 BPM | TEMPERATURE: 97 F | HEIGHT: 66 IN | WEIGHT: 178 LBS

## 2021-09-27 DIAGNOSIS — R06.89 OTHER ABNORMALITIES OF BREATHING: ICD-10-CM

## 2021-09-27 DIAGNOSIS — R93.89 ABNORMAL FINDINGS ON DIAGNOSTIC IMAGING OF OTHER SPECIFIED BODY STRUCTURES: ICD-10-CM

## 2021-09-27 PROCEDURE — 99215 OFFICE O/P EST HI 40 MIN: CPT

## 2021-09-27 NOTE — HISTORY OF PRESENT ILLNESS
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 PMH of DM, arthritis  ??RA LOW IgM, ,  knee surgery, obesity, GERD. ??/MUSCULAR WEAKNESS[ SEEING DR MAY] --She is a life long non smoker. No pets at home. She is 5'7", approx 170 lbs\par \par she reports dyspnea is worse with extreme exertion. 6mwt in Dr Araujo office showed desat 88%\par Pt was using inhalers but they did not help so she stopped\par \par CXR showed overinflation\par CTA chest was neg for pE but showed sclerotic changes of her LS spine\par \par PFT July 2020 within normal limit\par Exercise oximetry July 2020 no evidence of desaturation\par april 2020- c/o dyspnea-- prednisone is causing blood sugar elevations\par \par \par july 2020---Patient was seen in the office today complaints of backache--- was referred from Dr. Araujo because on his 6-minute walk test there was some evidence of desaturation------- exercise oximetry done in our office in July 2020 shows no evidence of desaturation-----she does have evidence of sclerotic process in her lumbar spine for which she has been referred for further work-up to spine specialist and hematology oncology----------- I would like to get details of her cardiac work-up from Dr. Clifford's office------ advised to do full pulmonary function testing sleep study through Dr. Araujo's office------ return to my clinic in 2 months time\par ----I have kept her on steroids but she felt better on that but was not able to tolerate it and that has been stopped I have advised her to continue with Symbicort in the meantime\par JAN 2021------- STILL C/O DYSPNEA ON EXERTION--NEEDS CARDIAC WORK UP---PSG---ALSO NEED TO F/U WITH NEUROLOGY TO R/O ANY MUSCULAR CAUSE FOR TIS DYSPNEA ON EXERTION \par \par psg- normal study\par \par 3/2021 c/o dyspnea on extreme exertion --HAS arthritis possible rheumatoid arthritis asthma\par having joint pains- h/o OP\par following neurology for neuromuscular weakness\par s/p cardiac eval at [facility] - reports all ok\par She received her covid vac\par \par 7/2021 ----being worked up for coronary artery disease at [facility] ----details not available patient supposed to bring the records for me to review \par following allergist Dr Plummer for low IGM and Dr Sun to r/o Mysthenia Gravis\par Has seen heme Dr Abbott in past for  IgM monoclonal proteins   r/o POEMS SYNDROME\par She has no current resp complaints\par She is a nonsmoker\par She is up to date w/covid vac

## 2021-09-27 NOTE — DISCUSSION/SUMMARY
[FreeTextEntry1] : -----assessment and plan----\par 71 yo referred for chronic dyspnea\par Not clear if she has any specific pulmonary disorders she did not desaturate on exercise oximetry in July 2020---echo normal---due to backache unable to do cpet\par \par 1??? CAD   --being worked up for coronary artery disease at [facility] ----details not available patient supposed to bring the records for me to review\par 2 -no evidence of myasthenia as per  -NEUROLOGY DR stanley \par 3_  selective IgM deficiency (SIGMD)--f/u with Dr Plummer\par \par 4 hyperreactive airway disease continue Symbicort and Ventolin for now\par 5 referred to rheum for  ??/RHEUMATOID ARTHRITIS---\par 6- she is up to date w/covid vac\par 7- f/u iDR VIRGIE-----  R/O INDOELNT MYELOMA  --R/O POEMS SYNDROME\par 8-has breast lump awaiting breast biopsy\par \par f/u in november 2021\par \par \par \par Thanks for allowing  me to participate  in the care of this patient.  Patient at this time  will follow  the above mentioned recommendations and return back for follow up visit. If you have any questions  I can be reached  at #[phone] (beeper)  or  [phone] (office).\par \par Merna Little MD, FCCP \par Director, Pulmonary Hypertension Program \par [facility] \par Division of Pulmonary, Critical Care and Sleep Medicine \par  Professor of Medicine \par [employer]\par

## 2021-09-28 LAB
ALBUMIN SERPL ELPH-MCNC: 4.2 G/DL
ALP BLD-CCNC: 88 U/L
ALT SERPL-CCNC: 10 U/L
ANION GAP SERPL CALC-SCNC: 12 MMOL/L
AST SERPL-CCNC: 17 U/L
BASOPHILS # BLD AUTO: 0.03 K/UL
BASOPHILS NFR BLD AUTO: 0.5 %
BILIRUB SERPL-MCNC: 0.2 MG/DL
BUN SERPL-MCNC: 11 MG/DL
CALCIUM SERPL-MCNC: 9.7 MG/DL
CHLORIDE SERPL-SCNC: 104 MMOL/L
CO2 SERPL-SCNC: 27 MMOL/L
CREAT SERPL-MCNC: 0.81 MG/DL
DEPRECATED KAPPA LC FREE/LAMBDA SER: 0.99 RATIO
EOSINOPHIL # BLD AUTO: 0.07 K/UL
EOSINOPHIL NFR BLD AUTO: 1.2 %
GLUCOSE SERPL-MCNC: 177 MG/DL
HCT VFR BLD CALC: 42.8 %
HGB BLD-MCNC: 13.3 G/DL
IGA SER QL IEP: 213 MG/DL
IGG SER QL IEP: 1427 MG/DL
IGM SER QL IEP: 128 MG/DL
IMM GRANULOCYTES NFR BLD AUTO: 0.2 %
KAPPA LC CSF-MCNC: 1.77 MG/DL
KAPPA LC SERPL-MCNC: 1.75 MG/DL
LYMPHOCYTES # BLD AUTO: 2.08 K/UL
LYMPHOCYTES NFR BLD AUTO: 36.3 %
MAN DIFF?: NORMAL
MCHC RBC-ENTMCNC: 28.5 PG
MCHC RBC-ENTMCNC: 31.1 GM/DL
MCV RBC AUTO: 91.6 FL
MONOCYTES # BLD AUTO: 0.32 K/UL
MONOCYTES NFR BLD AUTO: 5.6 %
NEUTROPHILS # BLD AUTO: 3.22 K/UL
NEUTROPHILS NFR BLD AUTO: 56.2 %
PLATELET # BLD AUTO: 285 K/UL
POTASSIUM SERPL-SCNC: 4.6 MMOL/L
PROT SERPL-MCNC: 7.1 G/DL
RBC # BLD: 4.67 M/UL
RBC # FLD: 14.1 %
SODIUM SERPL-SCNC: 142 MMOL/L
WBC # FLD AUTO: 5.73 K/UL

## 2021-09-30 LAB
ALBUMIN MFR SERPL ELPH: 55.9 %
ALBUMIN SERPL-MCNC: 4.1 G/DL
ALBUMIN/GLOB SERPL: 1.3 RATIO
ALPHA1 GLOB MFR SERPL ELPH: 3.7 %
ALPHA1 GLOB SERPL ELPH-MCNC: 0.3 G/DL
ALPHA2 GLOB MFR SERPL ELPH: 10.8 %
ALPHA2 GLOB SERPL ELPH-MCNC: 0.8 G/DL
B-GLOBULIN MFR SERPL ELPH: 11.7 %
B-GLOBULIN SERPL ELPH-MCNC: 0.9 G/DL
GAMMA GLOB FLD ELPH-MCNC: 1.3 G/DL
GAMMA GLOB MFR SERPL ELPH: 17.9 %
INTERPRETATION SERPL IEP-IMP: NORMAL
PROT SERPL-MCNC: 7.3 G/DL
PROT SERPL-MCNC: 7.3 G/DL

## 2022-01-03 ENCOUNTER — APPOINTMENT (OUTPATIENT)
Dept: NEUROLOGY | Facility: CLINIC | Age: 72
End: 2022-01-03

## 2022-01-24 ENCOUNTER — APPOINTMENT (OUTPATIENT)
Dept: PULMONOLOGY | Facility: CLINIC | Age: 72
End: 2022-01-24

## 2022-11-15 ENCOUNTER — APPOINTMENT (OUTPATIENT)
Dept: ORTHOPEDIC SURGERY | Facility: CLINIC | Age: 72
End: 2022-11-15

## 2022-11-15 DIAGNOSIS — Z96.651 PRESENCE OF RIGHT ARTIFICIAL KNEE JOINT: ICD-10-CM

## 2022-11-15 PROCEDURE — 73562 X-RAY EXAM OF KNEE 3: CPT | Mod: RT

## 2022-11-15 PROCEDURE — 99204 OFFICE O/P NEW MOD 45 MIN: CPT

## 2022-11-15 NOTE — DISCUSSION/SUMMARY
[de-identified] : The patient was advised of the diagnosis.  The natural history of the pathology was explained in full to the patient in layman's terms. All questions were answered.  The risks and benefits of surgical and non-surgical treatment alternatives were explained in full to the patient.\par \par Entered by Saida Barbosa acting as scribe.\par

## 2022-11-15 NOTE — ASSESSMENT
[FreeTextEntry1] : 11/15/22: Long discussion about recall on implant. I am concerned that w/o removal, it may cause her condition to worsen. She is able to work through the pain and discomfort right now. At this point, I recommend repeat XR's in 3 months to evaluate bone structure and continue to monitor her symptoms. She will bring results from aspiration results and letter for exact reason for recall. She is hesitant to proceed with revision at this time.\par

## 2022-11-15 NOTE — HISTORY OF PRESENT ILLNESS
[Gradual] : gradual [Dull/Aching] : dull/aching [Tightness] : tightness [Intermittent] : intermittent [Rest] : rest [Heat] : heat [de-identified] : 11/15/22: 71 y/o F presents for second opinion with hx of R TKA 2013 years ago by Dr. Vargas at \[facility]\"". She states there is a re-call on the implant that she currently has in the knee. She started to have pain and discomfort in the knee 2-3 years ago. She had a difficulty postop course with loss of bleeding, breathing issues and MARTÍN. Pain affects her walking and bending the knee. She had a recent asp but does not have the results with her.  [] : no [FreeTextEntry1] : right knee  [FreeTextEntry5] : TYE HUNTER is a 72 year old female here today for right knee pain. pt states pain returned 2-3 years ago. approx 6 months ago, surgeon told her there was a recall on the hardware. he suggested a replacement, she would like a second opinion. \par hx pf R TKA 2013 [de-identified] : weather changes [de-identified] : 2013

## 2023-02-07 ENCOUNTER — APPOINTMENT (OUTPATIENT)
Dept: ORTHOPEDIC SURGERY | Facility: CLINIC | Age: 73
End: 2023-02-07

## 2023-03-13 ENCOUNTER — APPOINTMENT (OUTPATIENT)
Dept: ORTHOPEDIC SURGERY | Facility: CLINIC | Age: 73
End: 2023-03-13
Payer: COMMERCIAL

## 2023-03-13 VITALS — HEIGHT: 66 IN | BODY MASS INDEX: 28.61 KG/M2 | WEIGHT: 178 LBS

## 2023-03-13 PROCEDURE — 99213 OFFICE O/P EST LOW 20 MIN: CPT

## 2023-03-13 PROCEDURE — 73562 X-RAY EXAM OF KNEE 3: CPT | Mod: RT

## 2023-03-13 NOTE — ASSESSMENT
[FreeTextEntry1] : Previous doc:\par 11/15/22: Long discussion about recall on implant. I am concerned that w/o removal, it may cause her condition to worsen. She is able to work through the pain and discomfort right now. At this point, I recommend repeat XR's in 3 months to evaluate bone structure and continue to monitor her symptoms. She will bring results from aspiration results and letter for exact reason for recall. She is hesitant to proceed with revision at this time.\par \par 3/13/23: Cont pain and swelling, no significant XR change.  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  Offered revision for this and discussed risk of delaying surgery including permanent soft tissue, bone loss, fx, etc - she is trying to hold off on surgery at this time.  Return for reeval in 3 months.

## 2023-03-13 NOTE — IMAGING
[All Views] : anteroposterior, lateral, skyline, and anteroposterior standing [Components well fixed, in good position] : Components well fixed, in good position [de-identified] : right knee:\par large effusion \par 0-115\par 5/5\par NIV\par good pulses \par ligaments stable [FreeTextEntry9] : well fixed semi constrained cemented TKA -  good alighnment\par well fixed cemented semi-constrained TKA - good postion - no sign of loosening  or obv osteolysis

## 2023-03-13 NOTE — HISTORY OF PRESENT ILLNESS
[Gradual] : gradual [Dull/Aching] : dull/aching [Tightness] : tightness [Intermittent] : intermittent [Rest] : rest [Heat] : heat [de-identified] : 3/13/23: Cont right knee pain and swelling, no change.\par \par Previous doc:\par 11/15/22: 73 y/o F presents for second opinion with hx of R TKA 2013 years ago by Dr. Vargas at \[facility]". She states there is a re-call on the implant that she currently has in the knee. She started to have pain and discomfort in the knee 2-3 years ago. She had a difficulty postop course with loss of bleeding, breathing issues and MARTÍN. Pain affects her walking and bending the knee. She had a recent asp but does not have the results with her.  [] : no [FreeTextEntry1] : right knee  [FreeTextEntry5] : TYE HUNTER is a 72 year old female here today for right knee pain. pt states pain returned 2-3 years ago. approx 6 months ago, surgeon told her there was a recall on the hardware. he suggested a replacement, she would like a second opinion. \par hx pf R TKA 2013 [de-identified] : weather changes [de-identified] : 2013

## 2025-03-06 NOTE — DISCUSSION/SUMMARY
3/6/2025      Macrina Gonzales  [address]  [facility] [address]      Dear Colleague,    Thank you for referring your patient, Macrina Gonzales, to the [facility] [place]. Please see a copy of my visit note below.    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 Doing exceptionally well  -check labs  -check fecal calprotectin  -check colonoscopy to ensure no significant ileal recurrence. If no significant inflammation 4 years off therapy we can likely space out imaging/endoscopic follow up  -continue fiber in diet    2. Prior B12 deficiency. Check b12    IBD HISTORY  Age at diagnosis: 2004  Extent of disease: ileal  Disease phenotype: stricturing  Amanda-anal disease: none  Current CD medications: none  Prior IBD surgeries: ileal resection with IC anastomosis  Prior IBD Medications:  -prednisone - remote courses  -6-MP did not control disease  -infliximab - did well - switched for insurance issues - switched to adalimumab (also easier to use - no infusions)  -adalimumab - started 2016 and continued until CVA 11/2021 (vertebral artery dissection)    DRUG MONITORING  TPMT enzyme activity: 32 (normal)     6-TGN/6-MMPN levels: NA     Biologic concentration: unknown    DISEASE ASSESSMENT  Labs  Recent Labs   Lab Test 03/07/24  1031 12/04/23  0937 03/08/23  0842 09/22/22  1253   CRP  --   --   --  <0.1   SED 10  --  12  --    HGB 13.7   < > 13.8  --     < > = values in this interval not displayed.     Fecal calprotectin: 10.9 9/2022  Endoscopy: Colonoscopy 3/2023 - No significant inflammation - 2-3 small aphthous ulcers in yg-terminal ileum/anastomosis - rutgeert's i1  Enterography:   -MRE normal with no active inflammation spring 2023  -MRE early 2024 with no active inflammation  C diff: none    sIBDQ:   IBDQ Score Date IBDQ -  PT CARE ASSUMED. RR EVEN AND UNLABORED. DEINIES PAIN AT THIS TIME. NO S/S OF
DISTRESS NOTED. WILL CPOC. [FreeTextEntry1] : -----assessment and plan----\par 70 yo referred for chronic dyspnea\par \par will start prednisone to 7.5mg\par will get bone scan given sclerotic LS spine\par will get labs (done at work) to r/o multiple myeloma\par will check cpet and EOT at ofice visit in july\par start symbicort and ventolin\par sleep study w/Dr Araujo\par f/u in 3 weeks\par reviewed covid precautions, all questions answered\par \par \par \par Thanks for allowing  me to participate  in the care of this patient.  Patient at this time  will follow  the above mentioned recommendations and return back for follow up visit. If you have any questions  I can be reached  at #[phone] (beeper)  or  [phone] (office).\par \par Merna Little MD, FCCP \par Director, Pulmonary Hypertension Program \par [facility] \par Division of Pulmonary, Critical Care and Sleep Medicine \par  Professor of Medicine \par [employer]\par  Total Score  (Higher score better)   3/6/2025   5:21 AM 68   9/21/2023   7:30 AM 66   9/15/2023   1:47 PM 68   3/1/2023  10:24 AM 65   9/20/2022   9:04 PM 60       IBD Health Care Maintenance:    Vaccinations:  All patients on biologics should avoid live vaccines.    -- Influenza (every year)  -- TdaP (every 10 years)  -- Pneumococcal Pneumonia (once plus booster at 5 years)  -- Yearly assessment for latent Tb (verbal screening and exam, PPD or QuantiFERON-Tb testing)     One time confirmation of immunity or serologies:  -- Hepatitis A (serologies or immunizations)  -- Hepatitis B (serologies or immunizations)  -- Varicella  -- MMR  -- HPV (all aged 18-26)  -- Meningococcal meningitis (all patients at risk for meningitis)  -- ok for live vaccines       Bone mineral density screening   -- Recommend all patients supplement with calcium and vitamin D  -- Given prior steroid use recommend DEXA if not already done - ORDERED 3/7/24     Cancer Screening:  Colon cancer screening:  Given ileal disease, DO NOT recommend patient undergo regular dysplasia surveillance   Next dysplasia screening is recommended NOT NEEDED.     Cervical cancer screening: Per OBGYN     Skin cancer screening: Not needed as not immunosuppressed     Depression Screening:  -- Over the last month, have you felt down, depressed, or hopeless? NO  -- Over the last month, have you felt little interest or pleasure doing things? NO     Misc:  -- Avoid tobacco use  -- Avoid NSAIDs as there is potentially a 25% chance of causing an IBD flare    Return to clinic in 12 months    Thank you for this consultation.  It was a pleasure to participate in the care of this patient; please contact us with any further questions.  I spent a total of 30 minutes during the day of encounter performed chart review, meeting with patient, patient counseling, care coordination, and documentation.      This note was created with voice recognition software, and while reviewed for  accuracy, typos may remain.     Gumaro Wood MD  Division of Gastroenterology, Hepatology and Nutrition  [facility]  Pager: [phone]      HPI:   Currently, here for her routine yearly follow up.    Continues to do great! No issues at all.    BMs regular - especially if she gets her fiber in her diet (greek yogurt and granola in AM and large salad for lunch; dinner varies). Can get a little constipated if fiber drops.    No rashes, joint pains, mouth sores, eye changes or other EIM.    Son is now 4 years old - they took him snowboarding in [place] for the first time.    ROS:    No fevers or chills  No weight loss  No blurry vision, double vision or change in vision  No sore throat  No lymphadenopathy  No headache, paraesthesias, or weakness in a limb  No shortness of breath or wheezing  No chest pain or pressure  No arthralgias or myalgias  No rashes or skin changes  No odynophagia or dysphagia  No BRBPR, hematochezia, melena  No dysuria, frequency or urgency  No hot/cold intolerance or polyria  No anxiety or depression    Extra intestinal manifestations of IBD:  No uveitis/episcleritis  No aphthous ulcers   No arthritis   No erythema nodosum/pyoderma gangrenosum.     PERTINENT PAST MEDICAL HISTORY:  Past Medical History:   Diagnosis Date     Cerebral infarction (H) December 23, 2021    Right vertebral artery dissection     Cerebrovascular accident (CVA) due to occlusion of right vertebral artery (H) 12/24/2021    Formatting of this note is different from the original.  12/28/2021 discharge -clopidogrel 75mg daily. ASA switched to 81mg QD on discharge. Per neuro will need to continue DAPT for 3-6 months. Will need stroke clinic follow-up in ~6 weeks and repeat imaging in three months.     Neurology 1/22 -Recommendations:  1) Further neurodiagnostic work up:   - Repeat CTA head and neck in April 2022.   - Ob     Vertebral artery dissection 07/11/2022       PREVIOUS SURGERIES:  Past Surgical History:   Procedure  Laterality Date     COLONOSCOPY  Need one this summer    Crohn s disease     COLONOSCOPY N/A 2/28/2023    Procedure: COLONOSCOPY, WITH BIOPSY;  Surgeon: Emerald Wood MD;  Location: [facility] OR     GI SURGERY  When I was 19    Iliectomy     ORTHOPEDIC SURGERY  When I was 16    Plate in right arm       PREVIOUS ENDOSCOPY:  Results for orders placed or performed during the hospital encounter of 02/28/23   COLONOSCOPY    Collection Time: 02/28/23  7:08 AM   Result Value Ref Range    COLONOSCOPY       [facility]  [address] [place], [place] [address]., [place] [address] [phone]     Endoscopy Department  _______________________________________________________________________________  Patient Name: Macrina Gonzales          Procedure Date: 2/28/2023 7:08 AM  MRN: [id number]                       Account Number: [id number]  YOB: 1987              Admit Type: Outpatient  Age: 36                               Room: [facility] PROCEDURE ROOM 02  Gender: Female                        Note Status: Finalized  Attending MD: EMERALD WOOD MD  Total Sedation Time:   _______________________________________________________________________________     Procedure:             Colonoscopy  Indications:           Disease activity assessment of Crohn's disease of the                          small bowel  Providers:             EMERALD WOOD MD, Carol Zaman, CRNA  Referring MD:          Alea Jerry MD  Requesting Provider:   MIKY Jerry MD  Medicines:             Monitored Anesthesia Care  Complications:         No immediate complications.  _______________________________________________________________________________  Procedure:             Pre-Anesthesia Assessment:                         - See EPIC H and P note                         After obtaining informed consent, the colonoscope was                          passed under direct vision.  Throughout the procedure,                          the patient's blood pressure, pulse, and oxygen                          saturations were monitored continuously. The                          Colonoscope was introduced through the anus and                          advanced to the ileocolonic anastomosis. The                          colonoscopy was performed without difficulty. The                          patient tolerated the procedure well. The quality of                          the bowel preparation was evaluated using the BBPS                          ( [place] Bowel Preparation Scale) with scores of: Right                          Colon = 3, Transverse Colon = 3 and Left Colon = 3                          (entire mucosa seen well with no residual staining,                          small fragments of stool or opaque liquid). The total                          BBPS score equals 9.                                                                                   Findings:       Skin tags were found on perianal exam.       The Simple Endoscopic Score for Crohn's Disease was determined based on        the endoscopic appearance of the mucosa in the following segments:       - Ileum: Findings include aphthous ulcers less than 0.5 cm in size, less        than 10% ulcerated surfaces, no affected surfaces and no narrowings.        Segment score: 2.       - Right Colon: Findings include no ulcers present, no ulcerated        surfaces, no affected surfaces and no narrowings. Segment score: 0.       - Transverse Colon: Findings include no ulcers p resent, no ulcerated        surfaces, no affected surfaces and no narrowings. Segment score: 0.       - Left Colon: Findings include no ulcers present, no ulcerated surfaces,        no affected surfaces and no narrowings. Segment score: 0.       - Rectum: Findings include no ulcers present, no ulcerated surfaces, no        affected surfaces and no narrowings. Segment score:  0.       - Total SES-CD aggregate score: 2. Biopsies were taken with a cold        forceps for histology of the ileum, right colon, left colon and rectum        (separate jars).       There was evidence of a prior end-to-end ileo-colonic anastomosis in the        ascending colon. This was patent and was characterized by healthy        appearing mucosa. The anastomosis was traversed.       A few small-mouthed diverticula were found in the sigmoid colon,        descending colon and transverse colon.       The exam was otherwise without abnormality on direct and retroflexion        views.                                                                                    Impression:            There were only 3 very small aphthous ulcers (1-3mm in                          diameter) at the ileo-colonic anastomosis and in the                          distal 5 cm of the yg-terminal ileum. This is                          consistent with Rutgeert's i1. She has been on                          medication for over a year and almost 2 years. Will                          follow up in clinic. Likely check MRE and if                          unremarkable will continue to monitor off therapy                          given how well she has done.                         - Perianal skin tags found on perianal exam.                         - Simple Endoscopic Score for Crohn's Disease: 2,                          mucosal inflammatory changes secondary to Crohn's                          disease, in remission. Biopsied.                         - Patent end-to-end ileo-colonic anastomosis,                          c haracterized by healthy appearing mucosa.                         - Diverticulosis in the sigmoid colon, in the                          descending colon and in the transverse colon.                         - The examination was otherwise normal on direct and                          retroflexion views.  Recommendation:         - Discharge patient to home (with escort).                         - Resume previous diet.                         - Continue present medications.                         - Await pathology results.                         - Repeat colonoscopy in 2 years for surveillance.                         - Return to GI clinic as previously scheduled.                                                                                     Electronically Signed by: Dr. Gumaro Magdaleno  ___________________________  GUMARO MAGDALENO MD  2/28/2023 9:59:45 AM  I was physically present for the entire viewing portion of the exam.  __________________________  Signature of [facility] physician  GUMARO MAGDALENO MD  Number of Addenda: 0    Note Initiated On: 2/28/2023 7:08 AM  Scope In:  Scope Out:     ]    ALLERGIES:   No Known Allergies    PERTINENT MEDICATIONS:    Current Outpatient Medications:      levonorgestrel (MIRENA) 52 MG (20 mcg/day) IUD, by Intrauterine route once. Placed 1/23/25. Remove: 1/23/33, Disp: , Rfl:      VITAMIN D (CHOLECALCIFEROL) PO, , Disp: , Rfl:     SOCIAL HISTORY:  Social History     Socioeconomic History     Marital status:      Spouse name: Not on file     Number of children: Not on file     Years of education: Not on file     Highest education level: Not on file   Occupational History     Not on file   Tobacco Use     Smoking status: Former     Current packs/day: 0.00     Average packs/day: 1 pack/day for 7.0 years (7.0 ttl pk-yrs)     Types: Cigarettes     Start date: 1/1/2003     Quit date: 1/1/2010     Years since quitting: 15.1     Passive exposure: Never     Smokeless tobacco: Never     Tobacco comments:     Smoked when I was younger, no longer smoke   Vaping Use     Vaping status: Never Used   Substance and Sexual Activity     Alcohol use: Yes     Comment: rare     Drug use: Yes     Types: Marijuana     Comment: 2     Sexual activity: Yes     Partners: Male     Birth control/protection:  Pull-out method, Natural Family Planning     Comment:  Lauro   Other Topics Concern     Parent/sibling w/ CABG, MI or angioplasty before 65F 55M? No   Social History Narrative     Not on file     Social Drivers of Health     Financial Resource Strain: Low Risk  (12/4/2024)    Financial Resource Strain      Within the past 12 months, have you or your family members you live with been unable to get utilities (heat, electricity) when it was really needed?: No   Food Insecurity: Low Risk  (12/4/2024)    Food Insecurity      Within the past 12 months, did you worry that your food would run out before you got money to buy more?: No      Within the past 12 months, did the food you bought just not last and you didn t have money to get more?: No   Transportation Needs: Low Risk  (12/4/2024)    Transportation Needs      Within the past 12 months, has lack of transportation kept you from medical appointments, getting your medicines, non-medical meetings or appointments, work, or from getting things that you need?: No   Physical Activity: Unknown (12/4/2024)    Exercise Vital Sign      Days of Exercise per Week: 6 days      Minutes of Exercise per Session: Not on file   Stress: Stress Concern Present (12/4/2024)    [place] [employer] - Occupational Stress Questionnaire      Feeling of Stress : To some extent   Social Connections: Unknown (12/4/2024)    Social Connection and Isolation Panel [NHANES]      Frequency of Communication with Friends and Family: Not on file      Frequency of Social Gatherings with Friends and Family: Twice a week      Attends Jewish Services: Not on file      Active Member of Clubs or Organizations: Not on file      Attends Club or Organization Meetings: Not on file      Marital Status: Not on file   Interpersonal Safety: Low Risk  (12/4/2024)    Interpersonal Safety      Do you feel physically and emotionally safe where you currently live?: Yes      Within the past 12  months, have you been hit, slapped, kicked or otherwise physically hurt by someone?: No      Within the past 12 months, have you been humiliated or emotionally abused in other ways by your partner or ex-partner?: No   Housing Stability: Low Risk  (12/4/2024)    Housing Stability      Do you have housing? : Yes      Are you worried about losing your housing?: No       FAMILY HISTORY:  Family History   Problem Relation Age of Onset     Anxiety Disorder Mother      Obesity Mother      Diabetes Father      Hypertension Father      Obesity Father      Breast Cancer Maternal Grandmother      Breast Cancer Paternal Grandmother      Anxiety Disorder Brother      Breast Cancer Cousin      Colorectal Cancer No family hx of        Past/family/social history reviewed and no changes    PHYSICAL EXAMINATION:  Constitutional: aaox3, cooperative, pleasant, not dyspneic/diaphoretic, no acute distress  Vitals reviewed: LMP 01/06/2025   Wt:   Wt Readings from Last 2 Encounters:   01/23/25 50.7 kg (111 lb 11.2 oz)   12/04/24 50.8 kg (112 lb)      Eyes: Sclera anicteric/injected  Ears/nose/mouth/throat: Normal oropharynx without ulcers or exudate, mucus membranes moist, hearing intact  Neck: supple, thyroid normal size  CV: No edema  Respiratory: Unlabored breathing  Lymph: No axillary, submandibular, supraclavicular or inguinal lymphadenopathy  Abd:  Nondistended, +bs, no hepatosplenomegaly, nontender, no peritoneal signs  Skin: warm, perfused, no jaundice  Psych: Normal affect  MSK: Normal gait      PERTINENT STUDIES:  Most recent CBC:  Recent Labs   Lab Test 03/07/24  1031 12/04/23  0937   WBC 6.7 4.1   HGB 13.7 13.1   HCT 41.3 39.9    261     Most recent hepatic panel:  Recent Labs   Lab Test 03/08/23  0842 09/22/22  1253   ALT 13 14   AST 16 18     Most recent creatinine:  Recent Labs   Lab Test 03/07/24  1031 12/04/23  0937   CR 0.75 0.74             Again, thank you for allowing me to participate in the care of your  patient.        Sincerely,        Gumaro Wood MD    Electronically signed